# Patient Record
Sex: MALE | Race: WHITE | NOT HISPANIC OR LATINO | Employment: OTHER | URBAN - METROPOLITAN AREA
[De-identification: names, ages, dates, MRNs, and addresses within clinical notes are randomized per-mention and may not be internally consistent; named-entity substitution may affect disease eponyms.]

---

## 2024-04-19 ENCOUNTER — LAB (OUTPATIENT)
Dept: LAB | Facility: LAB | Age: 79
End: 2024-04-19
Payer: MEDICARE

## 2024-04-19 DIAGNOSIS — Z00.00 ENCOUNTER FOR GENERAL ADULT MEDICAL EXAMINATION WITHOUT ABNORMAL FINDINGS: Primary | ICD-10-CM

## 2024-04-19 DIAGNOSIS — E78.5 HYPERLIPIDEMIA, UNSPECIFIED: ICD-10-CM

## 2024-04-19 DIAGNOSIS — I10 ESSENTIAL (PRIMARY) HYPERTENSION: ICD-10-CM

## 2024-04-19 LAB
ALBUMIN SERPL BCP-MCNC: 4.4 G/DL (ref 3.4–5)
ALP SERPL-CCNC: 69 U/L (ref 33–136)
ALT SERPL W P-5'-P-CCNC: 13 U/L (ref 10–52)
ANION GAP SERPL CALC-SCNC: 14 MMOL/L (ref 10–20)
APPEARANCE UR: CLEAR
AST SERPL W P-5'-P-CCNC: 16 U/L (ref 9–39)
BILIRUB SERPL-MCNC: 0.6 MG/DL (ref 0–1.2)
BILIRUB UR STRIP.AUTO-MCNC: NEGATIVE MG/DL
BUN SERPL-MCNC: 15 MG/DL (ref 6–23)
CALCIUM SERPL-MCNC: 9.6 MG/DL (ref 8.6–10.3)
CHLORIDE SERPL-SCNC: 102 MMOL/L (ref 98–107)
CHOLEST SERPL-MCNC: 137 MG/DL (ref 0–199)
CHOLESTEROL/HDL RATIO: 3.2
CO2 SERPL-SCNC: 28 MMOL/L (ref 21–32)
COLOR UR: YELLOW
CREAT SERPL-MCNC: 1.24 MG/DL (ref 0.5–1.3)
EGFRCR SERPLBLD CKD-EPI 2021: 60 ML/MIN/1.73M*2
ERYTHROCYTE [DISTWIDTH] IN BLOOD BY AUTOMATED COUNT: 12.4 % (ref 11.5–14.5)
GLUCOSE SERPL-MCNC: 96 MG/DL (ref 74–99)
GLUCOSE UR STRIP.AUTO-MCNC: NEGATIVE MG/DL
HCT VFR BLD AUTO: 48.7 % (ref 41–52)
HDLC SERPL-MCNC: 42.7 MG/DL
HGB BLD-MCNC: 16.8 G/DL (ref 13.5–17.5)
KETONES UR STRIP.AUTO-MCNC: NEGATIVE MG/DL
LDLC SERPL CALC-MCNC: 64 MG/DL
LEUKOCYTE ESTERASE UR QL STRIP.AUTO: NEGATIVE
MCH RBC QN AUTO: 32.2 PG (ref 26–34)
MCHC RBC AUTO-ENTMCNC: 34.5 G/DL (ref 32–36)
MCV RBC AUTO: 93 FL (ref 80–100)
NITRITE UR QL STRIP.AUTO: NEGATIVE
NON HDL CHOLESTEROL: 94 MG/DL (ref 0–149)
NRBC BLD-RTO: 0 /100 WBCS (ref 0–0)
PH UR STRIP.AUTO: 6 [PH]
PLATELET # BLD AUTO: 196 X10*3/UL (ref 150–450)
POTASSIUM SERPL-SCNC: 5.3 MMOL/L (ref 3.5–5.3)
PROT SERPL-MCNC: 7 G/DL (ref 6.4–8.2)
PROT UR STRIP.AUTO-MCNC: NEGATIVE MG/DL
RBC # BLD AUTO: 5.22 X10*6/UL (ref 4.5–5.9)
RBC # UR STRIP.AUTO: NEGATIVE /UL
SODIUM SERPL-SCNC: 139 MMOL/L (ref 136–145)
SP GR UR STRIP.AUTO: 1.02
TRIGL SERPL-MCNC: 150 MG/DL (ref 0–149)
UROBILINOGEN UR STRIP.AUTO-MCNC: 2 MG/DL
VLDL: 30 MG/DL (ref 0–40)
WBC # BLD AUTO: 8.9 X10*3/UL (ref 4.4–11.3)

## 2024-04-19 PROCEDURE — 84153 ASSAY OF PSA TOTAL: CPT

## 2024-04-19 PROCEDURE — 80053 COMPREHEN METABOLIC PANEL: CPT

## 2024-04-19 PROCEDURE — 81003 URINALYSIS AUTO W/O SCOPE: CPT

## 2024-04-19 PROCEDURE — 83036 HEMOGLOBIN GLYCOSYLATED A1C: CPT

## 2024-04-19 PROCEDURE — 80061 LIPID PANEL: CPT

## 2024-04-19 PROCEDURE — 85027 COMPLETE CBC AUTOMATED: CPT

## 2024-04-19 PROCEDURE — 36415 COLL VENOUS BLD VENIPUNCTURE: CPT

## 2024-04-20 LAB — PSA SERPL-MCNC: 2.14 NG/ML

## 2024-04-21 LAB
EST. AVERAGE GLUCOSE BLD GHB EST-MCNC: 103 MG/DL
HBA1C MFR BLD: 5.2 %

## 2024-04-25 ENCOUNTER — APPOINTMENT (OUTPATIENT)
Dept: RADIOLOGY | Facility: HOSPITAL | Age: 79
End: 2024-04-25
Payer: MEDICARE

## 2024-04-25 ENCOUNTER — HOSPITAL ENCOUNTER (OUTPATIENT)
Facility: HOSPITAL | Age: 79
Setting detail: OBSERVATION
Discharge: HOME | End: 2024-04-26
Attending: STUDENT IN AN ORGANIZED HEALTH CARE EDUCATION/TRAINING PROGRAM | Admitting: INTERNAL MEDICINE
Payer: MEDICARE

## 2024-04-25 ENCOUNTER — APPOINTMENT (OUTPATIENT)
Dept: VASCULAR MEDICINE | Facility: HOSPITAL | Age: 79
End: 2024-04-25
Payer: MEDICARE
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
## 2024-04-25 DIAGNOSIS — R60.0 LOCALIZED EDEMA: ICD-10-CM

## 2024-04-25 DIAGNOSIS — R42 DIZZINESS: ICD-10-CM

## 2024-04-25 LAB
ABO GROUP (TYPE) IN BLOOD: NORMAL
ALBUMIN SERPL BCP-MCNC: 3.9 G/DL (ref 3.4–5)
ALP SERPL-CCNC: 58 U/L (ref 33–136)
ALT SERPL W P-5'-P-CCNC: 15 U/L (ref 10–52)
ANION GAP SERPL CALC-SCNC: 8 MMOL/L (ref 10–20)
ANTIBODY SCREEN: NORMAL
AST SERPL W P-5'-P-CCNC: 17 U/L (ref 9–39)
BASOPHILS # BLD AUTO: 0.06 X10*3/UL (ref 0–0.1)
BASOPHILS NFR BLD AUTO: 0.9 %
BILIRUB SERPL-MCNC: 0.5 MG/DL (ref 0–1.2)
BNP SERPL-MCNC: 101 PG/ML (ref 0–99)
BUN SERPL-MCNC: 14 MG/DL (ref 6–23)
CALCIUM SERPL-MCNC: 8.6 MG/DL (ref 8.6–10.3)
CARDIAC TROPONIN I PNL SERPL HS: 4 NG/L (ref 0–20)
CHLORIDE SERPL-SCNC: 106 MMOL/L (ref 98–107)
CO2 SERPL-SCNC: 29 MMOL/L (ref 21–32)
CREAT SERPL-MCNC: 1.21 MG/DL (ref 0.5–1.3)
D DIMER PPP FEU-MCNC: 1105 NG/ML FEU
EGFRCR SERPLBLD CKD-EPI 2021: 62 ML/MIN/1.73M*2
EOSINOPHIL # BLD AUTO: 0.15 X10*3/UL (ref 0–0.4)
EOSINOPHIL NFR BLD AUTO: 2.2 %
ERYTHROCYTE [DISTWIDTH] IN BLOOD BY AUTOMATED COUNT: 12.4 % (ref 11.5–14.5)
ETHANOL SERPL-MCNC: <10 MG/DL
GLUCOSE SERPL-MCNC: 135 MG/DL (ref 74–99)
HCT VFR BLD AUTO: 43.8 % (ref 41–52)
HGB BLD-MCNC: 14.8 G/DL (ref 13.5–17.5)
IMM GRANULOCYTES # BLD AUTO: 0.02 X10*3/UL (ref 0–0.5)
IMM GRANULOCYTES NFR BLD AUTO: 0.3 % (ref 0–0.9)
INR PPP: 1 (ref 0.9–1.1)
LACTATE SERPL-SCNC: 1.8 MMOL/L (ref 0.4–2)
LYMPHOCYTES # BLD AUTO: 1.68 X10*3/UL (ref 0.8–3)
LYMPHOCYTES NFR BLD AUTO: 24.3 %
MAGNESIUM SERPL-MCNC: 1.95 MG/DL (ref 1.6–2.4)
MCH RBC QN AUTO: 31.6 PG (ref 26–34)
MCHC RBC AUTO-ENTMCNC: 33.8 G/DL (ref 32–36)
MCV RBC AUTO: 93 FL (ref 80–100)
MONOCYTES # BLD AUTO: 0.4 X10*3/UL (ref 0.05–0.8)
MONOCYTES NFR BLD AUTO: 5.8 %
NEUTROPHILS # BLD AUTO: 4.59 X10*3/UL (ref 1.6–5.5)
NEUTROPHILS NFR BLD AUTO: 66.5 %
NRBC BLD-RTO: 0 /100 WBCS (ref 0–0)
PLATELET # BLD AUTO: 171 X10*3/UL (ref 150–450)
POTASSIUM SERPL-SCNC: 4.3 MMOL/L (ref 3.5–5.3)
PROT SERPL-MCNC: 6.4 G/DL (ref 6.4–8.2)
PROTHROMBIN TIME: 11.8 SECONDS (ref 9.8–12.8)
RBC # BLD AUTO: 4.69 X10*6/UL (ref 4.5–5.9)
RH FACTOR (ANTIGEN D): NORMAL
SODIUM SERPL-SCNC: 139 MMOL/L (ref 136–145)
WBC # BLD AUTO: 6.9 X10*3/UL (ref 4.4–11.3)

## 2024-04-25 PROCEDURE — 84484 ASSAY OF TROPONIN QUANT: CPT | Performed by: STUDENT IN AN ORGANIZED HEALTH CARE EDUCATION/TRAINING PROGRAM

## 2024-04-25 PROCEDURE — 70551 MRI BRAIN STEM W/O DYE: CPT | Performed by: RADIOLOGY

## 2024-04-25 PROCEDURE — 70547 MR ANGIOGRAPHY NECK W/O DYE: CPT

## 2024-04-25 PROCEDURE — 93971 EXTREMITY STUDY: CPT | Performed by: INTERNAL MEDICINE

## 2024-04-25 PROCEDURE — 83605 ASSAY OF LACTIC ACID: CPT | Performed by: STUDENT IN AN ORGANIZED HEALTH CARE EDUCATION/TRAINING PROGRAM

## 2024-04-25 PROCEDURE — 70551 MRI BRAIN STEM W/O DYE: CPT

## 2024-04-25 PROCEDURE — 2550000001 HC RX 255 CONTRASTS: Performed by: INTERNAL MEDICINE

## 2024-04-25 PROCEDURE — 2500000004 HC RX 250 GENERAL PHARMACY W/ HCPCS (ALT 636 FOR OP/ED)

## 2024-04-25 PROCEDURE — G0378 HOSPITAL OBSERVATION PER HR: HCPCS

## 2024-04-25 PROCEDURE — 70450 CT HEAD/BRAIN W/O DYE: CPT

## 2024-04-25 PROCEDURE — 70547 MR ANGIOGRAPHY NECK W/O DYE: CPT | Performed by: RADIOLOGY

## 2024-04-25 PROCEDURE — 71275 CT ANGIOGRAPHY CHEST: CPT

## 2024-04-25 PROCEDURE — 84075 ASSAY ALKALINE PHOSPHATASE: CPT | Performed by: STUDENT IN AN ORGANIZED HEALTH CARE EDUCATION/TRAINING PROGRAM

## 2024-04-25 PROCEDURE — 99285 EMERGENCY DEPT VISIT HI MDM: CPT | Mod: 25

## 2024-04-25 PROCEDURE — 96372 THER/PROPH/DIAG INJ SC/IM: CPT

## 2024-04-25 PROCEDURE — 83735 ASSAY OF MAGNESIUM: CPT | Performed by: STUDENT IN AN ORGANIZED HEALTH CARE EDUCATION/TRAINING PROGRAM

## 2024-04-25 PROCEDURE — 71275 CT ANGIOGRAPHY CHEST: CPT | Performed by: RADIOLOGY

## 2024-04-25 PROCEDURE — 83880 ASSAY OF NATRIURETIC PEPTIDE: CPT | Performed by: STUDENT IN AN ORGANIZED HEALTH CARE EDUCATION/TRAINING PROGRAM

## 2024-04-25 PROCEDURE — 99222 1ST HOSP IP/OBS MODERATE 55: CPT

## 2024-04-25 PROCEDURE — 70450 CT HEAD/BRAIN W/O DYE: CPT | Performed by: STUDENT IN AN ORGANIZED HEALTH CARE EDUCATION/TRAINING PROGRAM

## 2024-04-25 PROCEDURE — 85610 PROTHROMBIN TIME: CPT | Performed by: STUDENT IN AN ORGANIZED HEALTH CARE EDUCATION/TRAINING PROGRAM

## 2024-04-25 PROCEDURE — 70544 MR ANGIOGRAPHY HEAD W/O DYE: CPT | Mod: 59

## 2024-04-25 PROCEDURE — 2500000006 HC RX 250 W HCPCS SELF ADMINISTERED DRUGS (ALT 637 FOR ALL PAYERS): Mod: MUE

## 2024-04-25 PROCEDURE — 93971 EXTREMITY STUDY: CPT

## 2024-04-25 PROCEDURE — 72125 CT NECK SPINE W/O DYE: CPT | Performed by: STUDENT IN AN ORGANIZED HEALTH CARE EDUCATION/TRAINING PROGRAM

## 2024-04-25 PROCEDURE — 2500000006 HC RX 250 W HCPCS SELF ADMINISTERED DRUGS (ALT 637 FOR ALL PAYERS)

## 2024-04-25 PROCEDURE — 86901 BLOOD TYPING SEROLOGIC RH(D): CPT | Performed by: STUDENT IN AN ORGANIZED HEALTH CARE EDUCATION/TRAINING PROGRAM

## 2024-04-25 PROCEDURE — 82077 ASSAY SPEC XCP UR&BREATH IA: CPT | Performed by: STUDENT IN AN ORGANIZED HEALTH CARE EDUCATION/TRAINING PROGRAM

## 2024-04-25 PROCEDURE — 2500000001 HC RX 250 WO HCPCS SELF ADMINISTERED DRUGS (ALT 637 FOR MEDICARE OP)

## 2024-04-25 PROCEDURE — 36415 COLL VENOUS BLD VENIPUNCTURE: CPT | Performed by: STUDENT IN AN ORGANIZED HEALTH CARE EDUCATION/TRAINING PROGRAM

## 2024-04-25 PROCEDURE — 72125 CT NECK SPINE W/O DYE: CPT

## 2024-04-25 PROCEDURE — 85025 COMPLETE CBC W/AUTO DIFF WBC: CPT | Performed by: STUDENT IN AN ORGANIZED HEALTH CARE EDUCATION/TRAINING PROGRAM

## 2024-04-25 PROCEDURE — 85379 FIBRIN DEGRADATION QUANT: CPT | Performed by: STUDENT IN AN ORGANIZED HEALTH CARE EDUCATION/TRAINING PROGRAM

## 2024-04-25 PROCEDURE — 70544 MR ANGIOGRAPHY HEAD W/O DYE: CPT | Performed by: RADIOLOGY

## 2024-04-25 RX ORDER — SIMVASTATIN 20 MG/1
40 TABLET, FILM COATED ORAL NIGHTLY
Status: DISCONTINUED | OUTPATIENT
Start: 2024-04-25 | End: 2024-04-26 | Stop reason: HOSPADM

## 2024-04-25 RX ORDER — ENOXAPARIN SODIUM 100 MG/ML
40 INJECTION SUBCUTANEOUS EVERY 24 HOURS
Status: DISCONTINUED | OUTPATIENT
Start: 2024-04-25 | End: 2024-04-26 | Stop reason: HOSPADM

## 2024-04-25 RX ORDER — METOPROLOL TARTRATE 25 MG/1
25 TABLET, FILM COATED ORAL 2 TIMES DAILY
Status: DISCONTINUED | OUTPATIENT
Start: 2024-04-25 | End: 2024-04-26 | Stop reason: HOSPADM

## 2024-04-25 RX ORDER — METOPROLOL TARTRATE 25 MG/1
1 TABLET, FILM COATED ORAL 2 TIMES DAILY
COMMUNITY

## 2024-04-25 RX ORDER — CLOPIDOGREL BISULFATE 75 MG/1
1 TABLET ORAL DAILY
COMMUNITY

## 2024-04-25 RX ORDER — ASPIRIN 81 MG/1
81 TABLET ORAL NIGHTLY
COMMUNITY

## 2024-04-25 RX ORDER — ONDANSETRON HYDROCHLORIDE 2 MG/ML
4 INJECTION, SOLUTION INTRAVENOUS EVERY 6 HOURS PRN
Status: DISCONTINUED | OUTPATIENT
Start: 2024-04-25 | End: 2024-04-26 | Stop reason: HOSPADM

## 2024-04-25 RX ORDER — ASPIRIN 81 MG/1
81 TABLET ORAL NIGHTLY
Status: DISCONTINUED | OUTPATIENT
Start: 2024-04-25 | End: 2024-04-26 | Stop reason: HOSPADM

## 2024-04-25 RX ORDER — MULTIVIT-MIN/IRON FUM/FOLIC AC 7.5 MG-4
1 TABLET ORAL NIGHTLY
Status: DISCONTINUED | OUTPATIENT
Start: 2024-04-25 | End: 2024-04-26 | Stop reason: HOSPADM

## 2024-04-25 RX ORDER — ACETAMINOPHEN 325 MG/1
650 TABLET ORAL EVERY 4 HOURS PRN
Status: DISCONTINUED | OUTPATIENT
Start: 2024-04-25 | End: 2024-04-26 | Stop reason: HOSPADM

## 2024-04-25 RX ORDER — SIMVASTATIN 40 MG/1
1 TABLET, FILM COATED ORAL NIGHTLY
COMMUNITY

## 2024-04-25 RX ORDER — CLOPIDOGREL BISULFATE 75 MG/1
75 TABLET ORAL DAILY
Status: DISCONTINUED | OUTPATIENT
Start: 2024-04-26 | End: 2024-04-26 | Stop reason: HOSPADM

## 2024-04-25 RX ORDER — MULTIVIT-MIN/IRON FUM/FOLIC AC 7.5 MG-4
1 TABLET ORAL NIGHTLY
COMMUNITY

## 2024-04-25 RX ADMIN — ASPIRIN 81 MG: 81 TABLET, COATED ORAL at 20:35

## 2024-04-25 RX ADMIN — IOHEXOL 75 ML: 350 INJECTION, SOLUTION INTRAVENOUS at 20:10

## 2024-04-25 RX ADMIN — SIMVASTATIN 40 MG: 20 TABLET, FILM COATED ORAL at 20:35

## 2024-04-25 RX ADMIN — METOPROLOL TARTRATE 25 MG: 25 TABLET, FILM COATED ORAL at 20:35

## 2024-04-25 RX ADMIN — Medication 1 TABLET: at 20:35

## 2024-04-25 RX ADMIN — ENOXAPARIN SODIUM 40 MG: 40 INJECTION SUBCUTANEOUS at 20:34

## 2024-04-25 SDOH — SOCIAL STABILITY: SOCIAL INSECURITY: HAS ANYONE EVER THREATENED TO HURT YOUR FAMILY OR YOUR PETS?: NO

## 2024-04-25 SDOH — SOCIAL STABILITY: SOCIAL INSECURITY: ARE THERE ANY APPARENT SIGNS OF INJURIES/BEHAVIORS THAT COULD BE RELATED TO ABUSE/NEGLECT?: NO

## 2024-04-25 SDOH — SOCIAL STABILITY: SOCIAL INSECURITY: WERE YOU ABLE TO COMPLETE ALL THE BEHAVIORAL HEALTH SCREENINGS?: YES

## 2024-04-25 SDOH — SOCIAL STABILITY: SOCIAL INSECURITY: HAVE YOU HAD THOUGHTS OF HARMING ANYONE ELSE?: NO

## 2024-04-25 SDOH — SOCIAL STABILITY: SOCIAL INSECURITY: HAVE YOU HAD ANY THOUGHTS OF HARMING ANYONE ELSE?: NO

## 2024-04-25 SDOH — SOCIAL STABILITY: SOCIAL INSECURITY: DO YOU FEEL ANYONE HAS EXPLOITED OR TAKEN ADVANTAGE OF YOU FINANCIALLY OR OF YOUR PERSONAL PROPERTY?: NO

## 2024-04-25 SDOH — SOCIAL STABILITY: SOCIAL INSECURITY: ARE YOU OR HAVE YOU BEEN THREATENED OR ABUSED PHYSICALLY, EMOTIONALLY, OR SEXUALLY BY ANYONE?: NO

## 2024-04-25 SDOH — SOCIAL STABILITY: SOCIAL INSECURITY: ABUSE: ADULT

## 2024-04-25 SDOH — SOCIAL STABILITY: SOCIAL INSECURITY: DOES ANYONE TRY TO KEEP YOU FROM HAVING/CONTACTING OTHER FRIENDS OR DOING THINGS OUTSIDE YOUR HOME?: NO

## 2024-04-25 SDOH — SOCIAL STABILITY: SOCIAL INSECURITY: DO YOU FEEL UNSAFE GOING BACK TO THE PLACE WHERE YOU ARE LIVING?: NO

## 2024-04-25 ASSESSMENT — PAIN - FUNCTIONAL ASSESSMENT
PAIN_FUNCTIONAL_ASSESSMENT: 0-10
PAIN_FUNCTIONAL_ASSESSMENT: 0-10

## 2024-04-25 ASSESSMENT — LIFESTYLE VARIABLES
EVER HAD A DRINK FIRST THING IN THE MORNING TO STEADY YOUR NERVES TO GET RID OF A HANGOVER: NO
EVER FELT BAD OR GUILTY ABOUT YOUR DRINKING: NO
HAVE YOU EVER FELT YOU SHOULD CUT DOWN ON YOUR DRINKING: NO
AUDIT-C TOTAL SCORE: 1
SKIP TO QUESTIONS 9-10: 1
TOTAL SCORE: 0
HAVE PEOPLE ANNOYED YOU BY CRITICIZING YOUR DRINKING: NO
HOW MANY STANDARD DRINKS CONTAINING ALCOHOL DO YOU HAVE ON A TYPICAL DAY: 1 OR 2
HOW OFTEN DO YOU HAVE A DRINK CONTAINING ALCOHOL: MONTHLY OR LESS
AUDIT-C TOTAL SCORE: 1
PRESCIPTION_ABUSE_PAST_12_MONTHS: NO
SUBSTANCE_ABUSE_PAST_12_MONTHS: NO
HOW OFTEN DO YOU HAVE 6 OR MORE DRINKS ON ONE OCCASION: NEVER

## 2024-04-25 ASSESSMENT — ACTIVITIES OF DAILY LIVING (ADL)
FEEDING YOURSELF: INDEPENDENT
GROOMING: INDEPENDENT
BATHING: INDEPENDENT
DRESSING YOURSELF: INDEPENDENT
WALKS IN HOME: INDEPENDENT
HEARING - LEFT EAR: FUNCTIONAL
PATIENT'S MEMORY ADEQUATE TO SAFELY COMPLETE DAILY ACTIVITIES?: YES
JUDGMENT_ADEQUATE_SAFELY_COMPLETE_DAILY_ACTIVITIES: YES
LACK_OF_TRANSPORTATION: NO
ADEQUATE_TO_COMPLETE_ADL: YES
HEARING - RIGHT EAR: FUNCTIONAL
TOILETING: INDEPENDENT

## 2024-04-25 ASSESSMENT — COLUMBIA-SUICIDE SEVERITY RATING SCALE - C-SSRS
1. IN THE PAST MONTH, HAVE YOU WISHED YOU WERE DEAD OR WISHED YOU COULD GO TO SLEEP AND NOT WAKE UP?: NO
6. HAVE YOU EVER DONE ANYTHING, STARTED TO DO ANYTHING, OR PREPARED TO DO ANYTHING TO END YOUR LIFE?: NO
2. HAVE YOU ACTUALLY HAD ANY THOUGHTS OF KILLING YOURSELF?: NO

## 2024-04-25 ASSESSMENT — COGNITIVE AND FUNCTIONAL STATUS - GENERAL
MOBILITY SCORE: 24
DAILY ACTIVITIY SCORE: 24
PATIENT BASELINE BEDBOUND: NO
DAILY ACTIVITIY SCORE: 24
MOBILITY SCORE: 24

## 2024-04-25 ASSESSMENT — PAIN SCALES - GENERAL
PAINLEVEL_OUTOF10: 0 - NO PAIN

## 2024-04-25 ASSESSMENT — PATIENT HEALTH QUESTIONNAIRE - PHQ9
SUM OF ALL RESPONSES TO PHQ9 QUESTIONS 1 & 2: 0
1. LITTLE INTEREST OR PLEASURE IN DOING THINGS: NOT AT ALL
2. FEELING DOWN, DEPRESSED OR HOPELESS: NOT AT ALL

## 2024-04-25 NOTE — CARE PLAN
The patient's goals for the shift include  remain free from injury.    The clinical goals for the shift include use call light appropriately when getting up.    Over the shift, the patient did not make progress toward the following goals. Barriers to progression include acute illness. Recommendations to address these barriers include communication.

## 2024-04-25 NOTE — H&P
History Of Present Illness  Aleaxnder Rose is a 79-year-old male who came to the ED today after going to his primary care doctor appointment. Patient has a history of CAD with stent placement, obesity, venous stasis, and mild to moderate sensorineural hearing loss. Patient reports that he fell almost two weeks ago and saw his primary care doctor who wanted him to follow up with an MRI/have labs drawn and then come back for another appointment. Since the time from the first fall patient has been having episodes where he feels out of balance. Patient denies dizziness, headaches, changes in vision, or where he feels like the room is spinning. Yesterday patient fell again, he said he has been feeling like he is going to fall forward and then loses his balance. Denies any N/V/D with these episodes. Patient today went to his primary care appointment today and reported he was unable to get the scans because they aren't scheduled for another few weeks so his primary care doctor sent him to the ED due to the increased falling. Patient states since the first fall his right leg has been swelling but has improved. Denies any visual cuts or change in strength. Patient also reports having urinary frequency and urgency throughout the day. Denies burning or pain while urinating. Patient denies any SOB, palpitations, chest pain, skin or sleep changes.   ED Course  Hemodynamically Stable.  Vitals: Temp.36.6 , HR 64 , Resp.16 , /69, Pulse ox 97 room air  Labs: Glucose 135, K+ 4.3,Na+ 139, Bun 14, Creat.  1.21, GFR 62, Ca 8.6, Alk Phos. 58, Albumin 3.9, ALT 15, AST 17, , Mg+ 1.95, Lact. 1.8, Hgb. 14.8, Hct. 43.8, Trop. 4, D-dimer 1,105  Medications: ASA, Plavix, Simvastatin  Imaging: CT cervical spine: No acute fracture or traumatic malalignment.  CT head: No acute intracranial abnormality or calvarial fracture.  Vascular ultrasound lower extremities: No evidence of acute deep vein thrombosis visualized, exam limited due  to habitus  EKG: Not available for my review     Past Medical History  Past Medical History:   Diagnosis Date    Chronic rhinitis     Rhinitis    Counseling, unspecified 09/16/2017    Health counseling    Other specified postprocedural states     H/O cardiac catheterization    Personal history of other diseases of the circulatory system     History of coronary atherosclerosis    Personal history of other diseases of the circulatory system     History of rheumatic fever    Personal history of other infectious and parasitic diseases     History of measles    Personal history of other infectious and parasitic diseases     History of varicella    Personal history of other infectious and parasitic diseases     History of mumps    Presence of coronary angioplasty implant and graft 09/15/2017    History of heart artery stent    Tobacco abuse counseling 09/16/2017    Tobacco abuse counseling   Kidney stones    Surgical History  Past Surgical History:   Procedure Laterality Date    EYE SURGERY  09/15/2017    Eye Surgery   Cardiac cathetrization 2012 with stent placement     Social History  Smokes tobacco products for 55 years. Current everyday smoker.   Live at home with son.     Family History   Mom: colon cancer, breast cancer, Father: CVA, cardiac issues  Allergies  Patient has no known allergies.    Review of Systems    10 point ROS systems completed and is negative except for what is stated in HPI.    Physical Exam  Constitutional:       General: He is awake. He is not in acute distress.     Appearance: Normal appearance. He is well-developed. He is not toxic-appearing.   HENT:      Head: Normocephalic and atraumatic.      Comments: Right eyelid droops. (Patient reports that is normal had surgery when 7)     Nose: Nose normal. No rhinorrhea.      Mouth/Throat:      Mouth: Mucous membranes are moist.      Tongue: Tongue deviates from midline.      Comments: Difficulty moving tongue back to the right, deviates to the  "left.   Eyes:      General:         Right eye: No discharge.         Left eye: No discharge.      Extraocular Movements: Extraocular movements intact.      Conjunctiva/sclera: Conjunctivae normal.      Pupils: Pupils are equal, round, and reactive to light.   Cardiovascular:      Rate and Rhythm: Normal rate and regular rhythm.      Pulses:           Radial pulses are 2+ on the right side and 2+ on the left side.        Dorsalis pedis pulses are 1+ on the right side and 2+ on the left side.      Heart sounds: Normal heart sounds.   Pulmonary:      Effort: Pulmonary effort is normal. No respiratory distress.      Breath sounds: Normal breath sounds. No wheezing.   Abdominal:      General: Bowel sounds are normal. There is no distension.      Palpations: Abdomen is soft.      Tenderness: There is no abdominal tenderness. There is no guarding.   Musculoskeletal:         General: Normal range of motion.      Cervical back: Normal range of motion and neck supple.      Right lower le+ Edema present.      Left lower leg: No edema.   Skin:     General: Skin is warm and dry.      Comments: Scab right anterior shin    Neurological:      General: No focal deficit present.      Mental Status: He is alert and oriented to person, place, and time. Mental status is at baseline.      GCS: GCS eye subscore is 4. GCS verbal subscore is 5. GCS motor subscore is 6.      Sensory: Sensation is intact.      Motor: Motor function is intact. No weakness.   Psychiatric:         Attention and Perception: Attention normal.         Mood and Affect: Mood normal.         Behavior: Behavior normal.          Last Recorded Vitals  Blood pressure 144/69, pulse 64, temperature 36.6 °C (97.9 °F), resp. rate 16, height 1.727 m (5' 8\"), weight 89.8 kg (197 lb 15.6 oz), SpO2 98%.    Relevant Results  Vascular US Lower Extremity Venous Duplex Right    Result Date: 2024           [address], [place], [place] [address] Tel " [phone] and Fax [phone]  Vascular Lab Report VASC US LOWER EXTREMITY VENOUS DUPLEX RIGHT  Patient Name:      JUANA MCKEON JASONARI   Reading Physician:  [id number] Lundberg MD Study Date:        4/25/2024            Ordering Physician: [id number] HONG HOYT MRN/PID:           [id number]             Technologist:       Kelsea Dinh RVT Accession#:        [id number]         Technologist 2: Date of Birth/Age: 1945 / 79 years Encounter#:         [id number] Gender:            M Admission Status:  Emergency            Location Performed: [facility]  Diagnosis/ICD: Localized (leg) edema-R60.0 Indication:    Limb edema CPT Codes:     39965 Peripheral venous duplex scan for DVT Limited  CONCLUSIONS: Right Lower Venous: No evidence of acute deep vein thrombus visualized in the right lower extremity. Cannot rule out thrombus in non-visualized posterior tibial and Peroneal veins due to body habitus and edema. Left Lower Venous: Left common femoral vein is negative for deep vein thrombus.  Imaging & Doppler Findings:  Right                 Compressible Thrombus        Flow Distal External Iliac     Yes        None   Spontaneous/Phasic CFV                       Yes        None   Spontaneous/Phasic PFV                       Yes        None FV Proximal               Yes        None   Spontaneous/Phasic FV Mid                    Yes        None FV Distal                 Yes        None Popliteal                 Yes        None   Spontaneous/Phasic  Left Compress Thrombus        Flow CFV    Yes      None   Spontaneous/Phasic  [id number] Fabiano Zuniga MD, DENNY Electronically signed by [id number] Lundberg MD on 4/25/2024 at 4:36:33 PM  ** Final **     CT cervical spine wo IV contrast    Result Date: 4/25/2024  Interpreted By:  Lakshmi Higgins, STUDY: CT CERVICAL SPINE WO IV CONTRAST   4/25/2024 2:38 pm   INDICATION: Signs/Symptoms:Fall, head trauma   COMPARISON: None.   ACCESSION NUMBER(S): [id number]   ORDERING CLINICIAN: HONG HOYT   TECHNIQUE: Axial CT images of the cervical spine are obtained. Coronal and sagittal reconstructions are provided for review.   FINDINGS: Prevertebral/Paraspinal Soft Tissues/Lung Apices: No acute abnormalities.   Other: Partial opacification right mastoid air cells. Small right sphenoid mucocele.   CERVICAL SPINE: Hardware: None. Fracture: None. Vertebral Body Heights: Normal. Alignment: No traumatic listhesis. Spinal canal and neural foramina: Multilevel spondylosis, most pronounced with severe neural foraminal narrowing at C3-C4 on the left, C4-C5 on the left, C5-C6 bilaterally. No high-grade canal stenosis.       No acute fracture or traumatic malalignment.   Signed by: Lakshmi Higgins 4/25/2024 2:57 PM Dictation workstation:   [id number]    CT head W O contrast trauma protocol    Result Date: 4/25/2024  Interpreted By:  Lakshmi Higgins, STUDY: CT HEAD W/O CONTRAST TRAUMA PROTOCOL;  4/25/2024 2:38 pm   INDICATION: Signs/Symptoms:Fall, head trauma.   COMPARISON: None.   ACCESSION NUMBER(S): [id number]   ORDERING CLINICIAN: HONG HOYT   TECHNIQUE: Noncontrast axial CT scan of head was performed.   FINDINGS: Parenchyma: There is no intracranial hemorrhage. The grey-white differentiation is intact. There is no mass effect or midline shift.   CSF Spaces: The ventricles, sulci and basal cisterns are within normal limits for age.   Extra-Axial Fluid: No extraaxial fluid collection.   Calvarium: No acute fracture.   Paranasal sinuses: Visualized paranasal sinuses are clear.   Mastoids: Clear.   Orbits: Normal.   Soft tissues: Unremarkable.       No acute intracranial abnormality or calvarial fracture.   MACRO: None   Signed by: Lakshmi Higgins 4/25/2024 2:49 PM Dictation workstation:   [id number]   Results for orders placed or performed during the  hospital encounter of 04/25/24 (from the past 24 hour(s))   CBC and Auto Differential   Result Value Ref Range    WBC 6.9 4.4 - 11.3 x10*3/uL    nRBC 0.0 0.0 - 0.0 /100 WBCs    RBC 4.69 4.50 - 5.90 x10*6/uL    Hemoglobin 14.8 13.5 - 17.5 g/dL    Hematocrit 43.8 41.0 - 52.0 %    MCV 93 80 - 100 fL    MCH 31.6 26.0 - 34.0 pg    MCHC 33.8 32.0 - 36.0 g/dL    RDW 12.4 11.5 - 14.5 %    Platelets 171 150 - 450 x10*3/uL    Neutrophils % 66.5 40.0 - 80.0 %    Immature Granulocytes %, Automated 0.3 0.0 - 0.9 %    Lymphocytes % 24.3 13.0 - 44.0 %    Monocytes % 5.8 2.0 - 10.0 %    Eosinophils % 2.2 0.0 - 6.0 %    Basophils % 0.9 0.0 - 2.0 %    Neutrophils Absolute 4.59 1.60 - 5.50 x10*3/uL    Immature Granulocytes Absolute, Automated 0.02 0.00 - 0.50 x10*3/uL    Lymphocytes Absolute 1.68 0.80 - 3.00 x10*3/uL    Monocytes Absolute 0.40 0.05 - 0.80 x10*3/uL    Eosinophils Absolute 0.15 0.00 - 0.40 x10*3/uL    Basophils Absolute 0.06 0.00 - 0.10 x10*3/uL   Comprehensive Metabolic Panel   Result Value Ref Range    Glucose 135 (H) 74 - 99 mg/dL    Sodium 139 136 - 145 mmol/L    Potassium 4.3 3.5 - 5.3 mmol/L    Chloride 106 98 - 107 mmol/L    Bicarbonate 29 21 - 32 mmol/L    Anion Gap 8 (L) 10 - 20 mmol/L    Urea Nitrogen 14 6 - 23 mg/dL    Creatinine 1.21 0.50 - 1.30 mg/dL    eGFR 62 >60 mL/min/1.73m*2    Calcium 8.6 8.6 - 10.3 mg/dL    Albumin 3.9 3.4 - 5.0 g/dL    Alkaline Phosphatase 58 33 - 136 U/L    Total Protein 6.4 6.4 - 8.2 g/dL    AST 17 9 - 39 U/L    Bilirubin, Total 0.5 0.0 - 1.2 mg/dL    ALT 15 10 - 52 U/L   Alcohol   Result Value Ref Range    Alcohol <10 <=10 mg/dL   Lactate   Result Value Ref Range    Lactate 1.8 0.4 - 2.0 mmol/L   Type And Screen   Result Value Ref Range    ABO TYPE B     Rh TYPE NEG     ANTIBODY SCREEN NEG    Troponin I, High Sensitivity   Result Value Ref Range    Troponin I, High Sensitivity 4 0 - 20 ng/L   B-Type Natriuretic Peptide   Result Value Ref Range     (H) 0 - 99 pg/mL    Magnesium   Result Value Ref Range    Magnesium 1.95 1.60 - 2.40 mg/dL   Protime-INR   Result Value Ref Range    Protime 11.8 9.8 - 12.8 seconds    INR 1.0 0.9 - 1.1   D-Dimer, VTE Exclusion   Result Value Ref Range    D-Dimer, Quantitative VTE Exclusion 1,105 (H) <=500 ng/mL FEU          Assessment/Plan   Alexander Rose is a 79-year-old male who came to the ED today after going to his primary care doctor appointment. Patient reports that he fell almost two weeks ago and saw his primary care doctor who wanted him to follow up with an MRI/have labs drawn. . Since the time from the first fall patient has been having episodes where he feels out of balance. Patient denies dizziness, headaches, changes in vision, or where he feels like the room is spinning. Yesterday patient fell again, he said he has been feeling like he is going to fall forward and then loses his balance. Neurology consult ordered for further evaluation. MRA of head and neck ordered.     Patient to follow with Dr. Rinku Almanza for further medical management.    #suspect CVA vs TIA  #Elevated D-Dimer  #imbalance  #Falls  -MRA head/neck/Brain ordered  -Carotid U/S <50% stenosis bilaterally 06/15/2022  -CoAgs done d-dimer elevated, CT PE ordered   -Vascular u/s complete bilateral LE: negative for DVT 4/25/24  -Lipid panel, HA1C, 4/19/2024  -UA: negative 4/19/2024  -TSH ordered  -Neurology c/s  -Patient admitted to observation with telemetry  -NPO diet until passes nurse swallow  -[id number]  -morning labs  -PT/OT/Speech  -S/W for discharge planning    Chronic Conditions  #Venous stasis  #Obesity  #CAD  #Hearing loss  Continue home medications as ordered when nurse swallow is complete and passed.  Full code     #DVT Prophylaxis  Scd's as tolerated  Subcutaneous Lovenox  On Plavix    I spent 45 minutes in the professional and overall care of this patient.      CHRISTY Gurrola-CNP

## 2024-04-25 NOTE — PROGRESS NOTES
Pharmacy Medication History Review    Alexander Rose is a 79 y.o. male admitted for No Principal Problem: There is no principal problem currently on the Problem List. Please update the Problem List and refresh.. Pharmacy reviewed the patient's [id number] medications and allergies for accuracy.    The list below reflectives the updated PTA list. Please review each medication in order reconciliation for additional clarification and justification.  Prior to Admission medications    Medication Sig Start Date End Date Taking? Authorizing Provider   aspirin 81 mg EC tablet Take 1 tablet (81 mg) by mouth once daily at bedtime.   Yes Historical Provider, MD   clopidogrel (Plavix) 75 mg tablet Take 1 tablet (75 mg) by mouth once daily.   Yes Historical Provider, MD   metoprolol tartrate (Lopressor) 25 mg tablet Take 1 tablet (25 mg) by mouth 2 times a day.   Yes Historical Provider, MD   multivitamin with minerals tablet Take 1 tablet by mouth once daily at bedtime.   Yes Historical Provider, MD   simvastatin (Zocor) 40 mg tablet Take 1 tablet (40 mg) by mouth once daily at bedtime.   Yes Historical Provider, MD        The list below reflectives the updated allergy list. Please review each documented allergy for additional clarification and justification.  Allergies  Reviewed by Albert Noyola LPN on 4/25/2024   No Known Allergies         Below are additional concerns with the patient's PTA list.      Keeley Lucas

## 2024-04-26 VITALS
BODY MASS INDEX: 30 KG/M2 | SYSTOLIC BLOOD PRESSURE: 139 MMHG | RESPIRATION RATE: 18 BRPM | HEIGHT: 68 IN | OXYGEN SATURATION: 97 % | HEART RATE: 61 BPM | WEIGHT: 197.97 LBS | DIASTOLIC BLOOD PRESSURE: 69 MMHG | TEMPERATURE: 98.6 F

## 2024-04-26 LAB
ANION GAP SERPL CALC-SCNC: 10 MMOL/L (ref 10–20)
BUN SERPL-MCNC: 16 MG/DL (ref 6–23)
CALCIUM SERPL-MCNC: 8.4 MG/DL (ref 8.6–10.3)
CHLORIDE SERPL-SCNC: 109 MMOL/L (ref 98–107)
CO2 SERPL-SCNC: 26 MMOL/L (ref 21–32)
CREAT SERPL-MCNC: 1.14 MG/DL (ref 0.5–1.3)
EGFRCR SERPLBLD CKD-EPI 2021: 66 ML/MIN/1.73M*2
ERYTHROCYTE [DISTWIDTH] IN BLOOD BY AUTOMATED COUNT: 12.2 % (ref 11.5–14.5)
GLUCOSE SERPL-MCNC: 94 MG/DL (ref 74–99)
HCT VFR BLD AUTO: 44 % (ref 41–52)
HGB BLD-MCNC: 14.9 G/DL (ref 13.5–17.5)
MCH RBC QN AUTO: 31.4 PG (ref 26–34)
MCHC RBC AUTO-ENTMCNC: 33.9 G/DL (ref 32–36)
MCV RBC AUTO: 93 FL (ref 80–100)
NRBC BLD-RTO: 0 /100 WBCS (ref 0–0)
PLATELET # BLD AUTO: 150 X10*3/UL (ref 150–450)
POTASSIUM SERPL-SCNC: 4.6 MMOL/L (ref 3.5–5.3)
RBC # BLD AUTO: 4.75 X10*6/UL (ref 4.5–5.9)
SODIUM SERPL-SCNC: 140 MMOL/L (ref 136–145)
TSH SERPL-ACNC: 2.96 MIU/L (ref 0.44–3.98)
WBC # BLD AUTO: 6.5 X10*3/UL (ref 4.4–11.3)

## 2024-04-26 PROCEDURE — 97165 OT EVAL LOW COMPLEX 30 MIN: CPT | Mod: GO

## 2024-04-26 PROCEDURE — 2500000001 HC RX 250 WO HCPCS SELF ADMINISTERED DRUGS (ALT 637 FOR MEDICARE OP)

## 2024-04-26 PROCEDURE — 85027 COMPLETE CBC AUTOMATED: CPT

## 2024-04-26 PROCEDURE — 92610 EVALUATE SWALLOWING FUNCTION: CPT | Mod: GN | Performed by: SPEECH-LANGUAGE PATHOLOGIST

## 2024-04-26 PROCEDURE — 84443 ASSAY THYROID STIM HORMONE: CPT

## 2024-04-26 PROCEDURE — 36415 COLL VENOUS BLD VENIPUNCTURE: CPT

## 2024-04-26 PROCEDURE — 97161 PT EVAL LOW COMPLEX 20 MIN: CPT | Mod: GP

## 2024-04-26 PROCEDURE — G0378 HOSPITAL OBSERVATION PER HR: HCPCS

## 2024-04-26 PROCEDURE — 80048 BASIC METABOLIC PNL TOTAL CA: CPT

## 2024-04-26 PROCEDURE — 99232 SBSQ HOSP IP/OBS MODERATE 35: CPT | Performed by: PSYCHIATRY & NEUROLOGY

## 2024-04-26 RX ADMIN — CLOPIDOGREL BISULFATE 75 MG: 75 TABLET ORAL at 09:09

## 2024-04-26 RX ADMIN — METOPROLOL TARTRATE 25 MG: 25 TABLET, FILM COATED ORAL at 09:09

## 2024-04-26 ASSESSMENT — COGNITIVE AND FUNCTIONAL STATUS - GENERAL
HELP NEEDED FOR BATHING: A LITTLE
DAILY ACTIVITIY SCORE: 20
MOBILITY SCORE: 24
PERSONAL GROOMING: A LITTLE
DRESSING REGULAR LOWER BODY CLOTHING: A LITTLE
DAILY ACTIVITIY SCORE: 24
MOBILITY SCORE: 24
TOILETING: A LITTLE

## 2024-04-26 ASSESSMENT — PAIN - FUNCTIONAL ASSESSMENT
PAIN_FUNCTIONAL_ASSESSMENT: 0-10

## 2024-04-26 ASSESSMENT — PAIN SCALES - GENERAL
PAINLEVEL_OUTOF10: 0 - NO PAIN

## 2024-04-26 NOTE — CARE PLAN
The patient's goals for the shift include      The clinical goals for the shift include use call light apperoperitly    Over the shift, the patient did not make progress toward the following goals. Barriers to progression include. Ongoing Recommendations to address these barriers include. Ongoing

## 2024-04-26 NOTE — PROGRESS NOTES
Occupational Therapy    Evaluation    Patient Name: Alexander Rose  MRN: [id number]  Today's Date: 4/26/2024  Time Calculation  Start Time: 1208  Stop Time: 1228  Time Calculation (min): 20 min    Assessment  IP OT Assessment  OT Assessment: Pt demonstrates a decline in adl/fucntional mob status since fall. Recommend continued OT tx intervention  Plan:  Treatment Interventions: ADL retraining, Functional transfer training  OT Frequency: 2 times per week  OT Discharge Recommendations: Low intensity level of continued care  OT - OK to Discharge: Yes (okay to d/c pending medical team approval)    Subjective   Current Problem:  1. Fall, initial encounter        2. Localized edema  Vascular US Lower Extremity Venous Duplex Right      3. Dizziness          General:  General  Reason for Referral: OT  EVAL/TX/IMPAIRED FUNCITONAL LIVNG SKILLS  Referred By: Rinku Almanza  Past Medical History Relevant to Rehab: PT fell yesterday hitting back of head (Past med history of CAD with stent placement, obesity, venous stasis, and mild to moderate sensorineural hearing loss. Patient reports that he fell almost two weeks ago)  Co-Treatment: PT  Co-Treatment Reason: increase pt safety  Prior to Session Communication: Bedside nurse  Precautions:   fall       Pain:0/10       Objective   Cognition:  Overall Cognitive Status: Within Functional Limits           Home Living:  Type of Home:  (5 entry steps with rail into home, bed/bath 2nd level, no 1/2 bath 1st floor. Tub/shower combo 2nd floor without DME. Toliet - no dme. Pt  indep with driving/ adl/cooking PTA. No ADL equip. Pt owns a standard walker and straight cane  but does not use.)  Lives With: Alone   Prior Function:Indep with driving, adl, home mgnt       Bed Mobility/Transfers: Bed Mobility  Bed Mobility:  (indep)    Transfers  Transfer:  (sit to stand - indep)      Functional Mobility:  Functional Mobility  Functional Mobility Performed:  (Supervision with turns- uses wall for  support, Supervison with straight cane use)            Vision:  wfl  Sensation:     Strength:strength below elbows wfl     Perception: wfl, no visual field cuts or dizziness with finger tracking     Coordination:    wfl  Hand Function:  Hand Function  Gross Grasp: Functional  Coordination: Functional  Extremities: RUE   RUE : Within Functional Limits and LUE   LUE: Within Functional Limits    Outcome Measures: [facility] Daily Activity  Putting on and taking off regular lower body clothing: A little  Bathing (including washing, rinsing, drying): A little  Putting on and taking off regular upper body clothing: None  Toileting, which includes using toilet, bedpan or urinal: A little  Taking care of personal grooming such as brushing teeth: A little (sink level)  Eating Meals: None  Daily Activity - Total Score: 20      Education Documentation  Body Mechanics, taught by Anne Deluca OT at 4/26/2024  2:02 PM.  Learner: Patient  Readiness: Acceptance  Method: Demonstration  Response: Demonstrated Understanding, Needs Reinforcement    Precautions, taught by Anne Deluca OT at 4/26/2024  2:02 PM.  Learner: Patient  Readiness: Acceptance  Method: Demonstration  Response: Demonstrated Understanding, Needs Reinforcement    ADL Training, taught by Anne Deluca OT at 4/26/2024  2:02 PM.  Learner: Patient  Readiness: Acceptance  Method: Demonstration  Response: Demonstrated Understanding, Needs Reinforcement    Education Comments  No comments found.      Goals:   Encounter Problems       Encounter Problems (Active)       impaired functional daily living skills       Pt will increase Grooming to s/mod indep (sink level)   Upper Body Bathing to s/mod indep    Lower Body Bathing  to s/mod indep    Increase  indep  and LE Dressing to s/mod indep with/ without adaptive equipment as needed       Start:  04/26/24    Expected End:  05/10/24            Pt will increase  Functional Mobility  with /without a device to s/mod  indep  chair/toliet/room to increase indep/safety in patients discharge environment       Start:  04/26/24    Expected End:  05/10/24

## 2024-04-26 NOTE — PROGRESS NOTES
Speech-Language Pathology    SLP Adult Inpatient Speech-Language Pathology Clinical Swallow Evaluation    Patient Name: Alexander Rose  MRN: [id number]  Today's Date: 4/26/2024   Time Calculation  Start Time: 0840  Stop Time: 0900  Time Calculation (min): 20 min     Current Problem:   1. Fall, initial encounter        2. Localized edema  Vascular US Lower Extremity Venous Duplex Right      3. Dizziness          Recommendations:  Ok to continue REGULAR TEXTURES, THIN LIQUIDS, no texture limitations.  General aspiration precautions.   Upright for meals/meds     Assessment:  Pt presents with intact oral phase of the swallow, and suspected functional pharyngeal swallow given clinical bedside indicators.  Pt is managing secretions. Oral motor ROM is WFL; Wears dentures. Intelligible speech. Voice is strong/audible. Pt can follow all commands for assessment.   PO trials 3 oz water challenge, solid foods/shortbread cookie.   ORAL PHASE: labial seal is intact, there is no labial loss of the oral bolus. Mastication is effective. Oral bolus formation and manipulation WFL. No significant oral residue after the swallow.   No pocketing.   PHARYNGEAL PHASE: no overt s/s aspiration, no change in vocal quality or respirations.   Will suggest pt maintain an oral diet...  ST to sign off, please make NPO if any changes in medical status or mentation that may impact safe chewing or swallowing, and consult ST for further testing.        Plan:  SLP Plan: No skilled SLP  No Skilled SLP: At baseline function, No acute SLP goals identified  Patient/Caregiver Agreeable: Yes  SLP - OK to Discharge: Yes    Subjective   Pt seen chairside, he finished breakfast, reports no difficulty/changes to chewing/swallowing. Pt took 4 meds whole without difficulty as well.  RN reports no issues. Pt's speech is intelligible, voice is audible, he easily engages in turn-taking conversation, on task, answers all questions, can verbalize events leading up to  hospitalization and tests he's had since he's been here. He is pleasant, cooperative.    Temp 97.7.  SpO2 95% on room air. WBC 6.5    Per H&P:  Alexander Rose is a 79-year-old male who came to the ED today after going to his primary care doctor appointment. Patient has a history of CAD with stent placement, obesity, venous stasis, and mild to moderate sensorineural hearing loss. Patient reports that he fell almost two weeks ago and saw his primary care doctor who wanted him to follow up with an MRI/have labs drawn and then come back for another appointment. Since the time from the first fall patient has been having episodes where he feels out of balance. Patient denies dizziness, headaches, changes in vision, or where he feels like the room is spinning. Yesterday patient fell again, he said he has been feeling like he is going to fall forward and then loses his balance. Denies any N/V/D with these episodes. Patient today went to his primary care appointment today and reported he was unable to get the scans because they aren't scheduled for another few weeks so his primary care doctor sent him to the ED due to the increased falling. Patient states since the first fall his right leg has been swelling but has improved     MR Brain wo IV contrast 4/25  IMPRESSION:  MRI Brain:  No evidence of acute infarct, intracranial mass effect or midline  shift.  Mild to moderate nonspecific white matter changes and parenchymal  volume loss.      MRA:  No evidence of major vessel cutoff or significant stenosis on MRA  head and neck.    General Visit Information:  Patient Class: Inpatient  [employer] Diet: regular texture, thin liquids  Current Diet : regular texture, thin liquids  Dysphagia Diagnosis: Within functional limits  Baseline Assessment:  Respiratory Status: Room air  Behavior/Cognition: Alert, Cooperative, Pleasant mood  Hearing: Within Functional Limits  Patient Positioning: Upright in Bed  Baseline Vocal Quality:  Normal  Volitional Cough: Strong  Volitional Swallow: Within Functional Limits  Pain:  Pain Assessment: 0-10  Pain Score: 0 - No pain   Oral/Motor Assessment:  Dentition:  (dentures)  Oral Motor: Within Functional Limits  Labial ROM: Within Functional Limits  Lingual ROM: Within Functional Limits  Lingual Symmetry:  (slight pull to left)  Intelligibility: Intelligible  Breath Support: Adequate for speech  Hearing: Within Functional Limits  Clinical Observations:  Patient Positioning: Upright in Bed  Management of Oral Secretions: Adequate  Suck Swallow Breathe Coordination: Functional  Was The 3 oz Swallow Protocol Completed: Yes    Inpatient:  Education Documentation  SLP has provided pt and caregiver/RN with the results and recommendations of this session.

## 2024-04-26 NOTE — PROGRESS NOTES
Physical Therapy    Physical Therapy    Physical Therapy Evaluation    Patient Name: Alexander Rose  MRN: [id number]  Today's Date: 4/26/2024   Time Calculation  Start Time: 1012  Stop Time: 1037  Time Calculation (min): 25 min    Assessment/Plan   PT Assessment  End of Session Patient Position: Up in chair, Alarm off, not on at start of session  IP OR SWING BED PT PLAN  Inpatient or Swing Bed: Inpatient  PT Plan  PT Plan: PT Eval only  PT Eval Only Reason: Only single session needed  PT Frequency: Other (Comment) (oppt)  PT Recommended Transfer Status:  (prn use of sc)  PT - OK to Discharge: Yes    Subjective     Current Problem:  Patient Active Problem List   Diagnosis    Falls, initial encounter       General Visit Information:  General  Reason for Referral: PT fell yesterday hitting back of head, dizziness, off balance x 2 weeks, rle swelling (-) dvt, ct c-sp and head (-), mri brain (-), imblance, suspect cva vs tia  Past Medical History Relevant to Rehab: Past med history of CAD with stent placement, obesity, venous stasis, and mild to moderate sensorineural hearing loss. Patient reports that he fell almost two weeks ago  Prior to Session Communication: Bedside nurse  Patient Position Received: Up in chair, Alarm on    Home Living:  Home Living  Type of Home:  (5 entry steps with rail into home, bed/bath 2nd level, no 1/2 bath 1st floor. Tub/shower combo 2nd floor without DME. [place] - no dme. Pt indep with driving/ adl/cooking PTA. No ADL equip. Pt owns a standard walker and straight cane but does not use.)  Lives With:  (w/ son who works)    Prior Level of Function:  Prior Function Per Pt/Caregiver Report  Level of [place]:  (indep w/o ad, does report falls outside and occas  reaches for furniture for support)  Homemaking Assistance:  (indep)    Precautions:       Vital Signs:     Objective     Pain:  Pain Assessment  Pain Score: 0 - No pain    Cognition:  Cognition  Overall Cognitive Status: Within  Functional Limits    General Assessments:                  Coordination  Movements are Fluid and Coordinated:  (b toe tap)             Functional Assessments:        Transfers  Transfer:  (indep)  Ambulation/Gait Training  Ambulation/Gait Training Performed:  (indep w/o ad 140ft except sba as  1x pt reached for wall for balance, up/down flight of steps w/ hr supervised, instructed pt in use of sc for outside use as past falls have occured outside home, ambs mod indep/s w/ sc 100ft no lob)          Extremity/Trunk Assessments:        RLE   RLE : Within Functional Limits  LLE   LLE : Within Functional Limits    Outcome Measures:  [facility] Basic Mobility  Turning from your back to your side while in a flat bed without using bedrails: None  Moving from lying on your back to sitting on the side of a flat bed without using bedrails: None  Moving to and from bed to chair (including a wheelchair): None  Standing up from a chair using your arms (e.g. wheelchair or bedside chair): None  To walk in hospital room: None  Climbing 3-5 steps with railing: None  Basic Mobility - Total Score: 24                                Education Documentation  No documentation found.  Education Comments  No comments found.

## 2024-04-26 NOTE — CONSULTS
Inpatient consult to Neurology  Consult performed by: Earnestine Hyde DO  Consult ordered by: CHRISTY Gurrola-CNP          History Of Present Illness  Alexander Rose is a 79 y.o. male presenting with falls. The patient reports that for a last few months he has felt unsteady when walking on uneven ground.  Over the last two weeks this unsteadiness has increased and he has had a few falls. He denies any dizziness. He denies any numbness. His legs do feel weak at times. He reports a sedentary lifestyle and spends most of his day seated. He used to exercise however has not been doing it recently. Does have some mild low back pain over the last week or so.  Saw his PCP who recommend admission to expedite work up. Denies any urinary incontinence.     Past Medical History  Past Medical History:   Diagnosis Date    Chronic rhinitis     Rhinitis    Counseling, unspecified 09/16/2017    Health counseling    Other specified postprocedural states     H/O cardiac catheterization    Personal history of other diseases of the circulatory system     History of coronary atherosclerosis    Personal history of other diseases of the circulatory system     History of rheumatic fever    Personal history of other infectious and parasitic diseases     History of measles    Personal history of other infectious and parasitic diseases     History of varicella    Personal history of other infectious and parasitic diseases     History of mumps    Presence of coronary angioplasty implant and graft 09/15/2017    History of heart artery stent    Tobacco abuse counseling 09/16/2017    Tobacco abuse counseling     Surgical History  Past Surgical History:   Procedure Laterality Date    EYE SURGERY  09/15/2017    Eye Surgery     Social History  Social History     Tobacco Use    Smoking status: Unknown     Allergies  Patient has no known allergies.  Medications Prior to Admission   Medication Sig Dispense Refill Last Dose    aspirin 81 mg EC  tablet Take 1 tablet (81 mg) by mouth once daily at bedtime.   4/24/2024 at pm    clopidogrel (Plavix) 75 mg tablet Take 1 tablet (75 mg) by mouth once daily.   4/25/2024 at am    metoprolol tartrate (Lopressor) 25 mg tablet Take 1 tablet (25 mg) by mouth 2 times a day.   4/25/2024 at am    multivitamin with minerals tablet Take 1 tablet by mouth once daily at bedtime.   4/24/2024 at pm    simvastatin (Zocor) 40 mg tablet Take 1 tablet (40 mg) by mouth once daily at bedtime.   4/24/2024 at pm       Review of Systems   Musculoskeletal:  Positive for gait problem.   All other systems reviewed and are negative.    Neurological Exam  Physical Exam    On general examination, the patient is well appearing and well groomed. Heart is regular, rate and rhythm. Lungs are clear to ausculation bilaterally. There is no peripheral edema. Normal pedal pulses bilaterally. No carotid bruits.   On neurologic examination, the mental status is unremarkable to informal testing. The history is related in quite good detail with no obvious deficit of attention, memory or language. Fund of knowledge is adequate. Orientation is intact to person, place and time. Spontaneous speech is fluent with no paraphasic or aphasic errors. On cranial nerve exam, the pupils are equal, round and reactive to light. Extraocular movements are full, without nystagmus. She reports no double vision on sustained upgaze or lateral gaze. Visual fields are full to confrontation. The fundi are benign with normal sharp disc margins. There is no bulbofacial weakness or ptosis. There is no ptosis with sustained upgaze. The tongue is midline with no wasting or fasciculations. The palate elevates symmetrically. Facial sensation is intact to light touch and pinprick bilaterally. Hearing is intact to finger rub bilaterally. Shoulder shrug is 5/5 bilaterally.  Neck flexion and extension have full strength. Motor examination reveals normal tone and bulk in the upper and lower  "extremities bilaterally. There are no fasciculations. There is full strength in the proximal and distal muscles of the upper and lower extremities bilaterally. Deep tendon reflexes are 2/4 and symmetric throughout the upper and lower extremities bilaterally, including the ankle jerks. Plantar responses are flexor bilaterally. Fine finger movements and rapid alternating movements are done well in both hands. There is no tremor. On coordination testing, finger-nose-finger testing are done well bilaterally. Sensory examination reveals normal light touch and vibratory sensation in the distal upper and lower extremities bilaterally. Gait show is fairly normal however he is unsteady with tandem gait. There is no Romberg sign. On functional testing, the patient can arise from a chair with no difficulty.     Last Recorded Vitals  Blood pressure 139/69, pulse 61, temperature 37 °C (98.6 °F), temperature source Temporal, resp. rate 18, height 1.727 m (5' 8\"), weight 89.8 kg (197 lb 15.6 oz), SpO2 97%.    Relevant Results  Scheduled medications  aspirin, 81 mg, oral, Nightly  clopidogrel, 75 mg, oral, Daily  enoxaparin, 40 mg, subcutaneous, q24h  metoprolol tartrate, 25 mg, oral, BID  multivitamin with minerals, 1 tablet, oral, Nightly  simvastatin, 40 mg, oral, Nightly      Continuous medications     PRN medications  PRN medications: acetaminophen, ondansetron    Results for orders placed or performed during the hospital encounter of 04/25/24 (from the past 24 hour(s))   CBC   Result Value Ref Range    WBC 6.5 4.4 - 11.3 x10*3/uL    nRBC 0.0 0.0 - 0.0 /100 WBCs    RBC 4.75 4.50 - 5.90 x10*6/uL    Hemoglobin 14.9 13.5 - 17.5 g/dL    Hematocrit 44.0 41.0 - 52.0 %    MCV 93 80 - 100 fL    MCH 31.4 26.0 - 34.0 pg    MCHC 33.9 32.0 - 36.0 g/dL    RDW 12.2 11.5 - 14.5 %    Platelets 150 150 - 450 x10*3/uL   Basic metabolic panel   Result Value Ref Range    Glucose 94 74 - 99 mg/dL    Sodium 140 136 - 145 mmol/L    Potassium 4.6 3.5 " - 5.3 mmol/L    Chloride 109 (H) 98 - 107 mmol/L    Bicarbonate 26 21 - 32 mmol/L    Anion Gap 10 10 - 20 mmol/L    Urea Nitrogen 16 6 - 23 mg/dL    Creatinine 1.14 0.50 - 1.30 mg/dL    eGFR 66 >60 mL/min/1.73m*2    Calcium 8.4 (L) 8.6 - 10.3 mg/dL   TSH   Result Value Ref Range    Thyroid Stimulating Hormone 2.96 0.44 - 3.98 mIU/L                Makeda Coma Scale  Best Eye Response: Spontaneous  Best Verbal Response: Oriented  Best Motor Response: Follows commands  M[place]a Coma Scale Score: 15                 I have personally reviewed the following imaging results CT angio chest for pulmonary embolism    Result Date: 4/25/2024  Interpreted By:  Sandy Thomas, STUDY: CT ANGIO CHEST FOR PULMONARY EMBOLISM;  4/25/2024 8:09 pm   INDICATION: Signs/Symptoms:elevated d-dimer.   COMPARISON: Noncontrast chest CT 10/20/2019   ACCESSION NUMBER(S): [id number]   ORDERING CLINICIAN: AARON LONG   TECHNIQUE: Axial CT images of the chest obtained  after intravenous administration of contrast. Maximum intensity projection images were created and reviewed.   FINDINGS: VESSELS: No aortic aneurysm. Calcified and noncalcified plaque throughout the descending thoracic aorta. No pulmonary embolism. HEART: Normal size. Moderate to severe coronary artery calcifications. No pericardial effusion. MEDIASTINUM AND ALAINA: No pathologically enlarged lymph nodes. LUNG, PLEURA, AND LARGE AIRWAYS: No pleural effusion or pneumothorax. No pulmonary consolidation or suspicious pulmonary nodule. Calcified granuloma in the right upper lobe. CHEST WALL AND LOWER NECK: Within normal limits.   UPPER ABDOMEN: No acute abnormality of the visualized abdomen. Stable hepatic cysts.   BONES: No acute osseous abnormality. Mild multilevel degenerative disc changes.       No pulmonary embolism or acute cardiopulmonary process.     MACRO: None   Signed by: Sandy Thomas 4/25/2024 9:44 PM Dictation workstation:   [id number]    MR brain wo IV contrast    Result  Date: 4/25/2024  Interpreted By:  Sneha Lubin, STUDY: MR BRAIN WO IV CONTRAST; MR ANGIO HEAD WO IV CONTRAST; MR ANGIO NECK WO IV CONTRAST;  4/25/2024 7:55 pm   INDICATION: Signs/Symptoms:ataxial falls; Signs/Symptoms:ataxia falls.  x   COMPARISON: CT head 04/25/2024   ACCESSION NUMBER(S): [id number]; [id number]; [id number]   ORDERING CLINICIAN: AARON LONG   TECHNIQUE: Axial T2, FLAIR, DWI, gradient echo T2 and  sagittal and coronal T1 weighted images of brain were acquired.   Time-of-flight MRA of the head  and neck was performed. The images were reviewed as source images and maximum intensity projections.   FINDINGS: Brain:   CSF Spaces: The ventricles, sulci and basal cisterns enlarged, concordant with parenchymal volume loss. Incidentally noted colpocephaly of the left lateral ventricle.   Parenchyma: There is no diffusion restriction abnormality to suggest acute infarct.  Scattered mild to moderate nonspecific T2/FLAIR hyperintense white matter changes present. Minimal T2/FLAIR hyperintense foci in the left basal ganglia. Prominent perivascular spaces within the basal ganglia. Moderate generalized parenchymal volume loss present. There is no mass effect or midline shift.   Paranasal Sinuses and Mastoids: Right greater than left mastoid air cell effusion. Mild mucosal thickening within the right sphenoid sinus. Otherwise visualized paranasal sinuses are unremarkable.   MRA of head:   Anterior circulation:    There is expected flow signal in bilateral intracranial internal carotid arteries, bilateral carotid terminals, bilateral proximal anterior and middle cerebral arteries.   Posterior circulation:    Bilateral intracranial vertebral arteries, vertebrobasilar junction, basilar artery and proximal posterior cerebral arteries demonstrate expected flow signal.   MRA of neck:   The source images are mildly degraded by artifact.   Right carotid vessels:  There is expected flow signal in the visualized  portion of the common carotid artery.  There is mild attenuation of flow signal at the carotid bifurcation which may be secondary to flow related artifact. The internal carotid artery in the neck demonstrates expected flow signal.   Left carotid vessels:   There is expected flow signal in the visualized portion of the common carotid artery.  There is mild attenuation of flow signal at the carotid bifurcation which may be secondary to flow related artifact. The internal carotid artery in the neck demonstrates expected flow signal.   Vertebral vessels:   The visualized segments of the cervical vertebral arteries demonstrate expected flow signal.       MRI Brain:   No evidence of acute infarct, intracranial mass effect or midline shift.   Mild to moderate nonspecific white matter changes and parenchymal volume loss.   MRA:   No evidence of major vessel cutoff or significant stenosis on MRA head and neck.   MACRO: None   Signed by: Sneha Lubin 4/25/2024 8:44 PM Dictation workstation:   [id number]    MR angio head wo IV contrast    Result Date: 4/25/2024  Interpreted By:  Sneha Lubin, STUDY: MR BRAIN WO IV CONTRAST; MR ANGIO HEAD WO IV CONTRAST; MR ANGIO NECK WO IV CONTRAST;  4/25/2024 7:55 pm   INDICATION: Signs/Symptoms:ataxial falls; Signs/Symptoms:ataxia falls.  x   COMPARISON: CT head 04/25/2024   ACCESSION NUMBER(S): [id number]; [id number]; [id number]   ORDERING CLINICIAN: AARON LONG   TECHNIQUE: Axial T2, FLAIR, DWI, gradient echo T2 and  sagittal and coronal T1 weighted images of brain were acquired.   Time-of-flight MRA of the head  and neck was performed. The images were reviewed as source images and maximum intensity projections.   FINDINGS: Brain:   CSF Spaces: The ventricles, sulci and basal cisterns enlarged, concordant with parenchymal volume loss. Incidentally noted colpocephaly of the left lateral ventricle.   Parenchyma: There is no diffusion restriction abnormality to suggest acute  infarct.  Scattered mild to moderate nonspecific T2/FLAIR hyperintense white matter changes present. Minimal T2/FLAIR hyperintense foci in the left basal ganglia. Prominent perivascular spaces within the basal ganglia. Moderate generalized parenchymal volume loss present. There is no mass effect or midline shift.   Paranasal Sinuses and Mastoids: Right greater than left mastoid air cell effusion. Mild mucosal thickening within the right sphenoid sinus. Otherwise visualized paranasal sinuses are unremarkable.   MRA of head:   Anterior circulation:    There is expected flow signal in bilateral intracranial internal carotid arteries, bilateral carotid terminals, bilateral proximal anterior and middle cerebral arteries.   Posterior circulation:    Bilateral intracranial vertebral arteries, vertebrobasilar junction, basilar artery and proximal posterior cerebral arteries demonstrate expected flow signal.   MRA of neck:   The source images are mildly degraded by artifact.   Right carotid vessels:  There is expected flow signal in the visualized portion of the common carotid artery.  There is mild attenuation of flow signal at the carotid bifurcation which may be secondary to flow related artifact. The internal carotid artery in the neck demonstrates expected flow signal.   Left carotid vessels:   There is expected flow signal in the visualized portion of the common carotid artery.  There is mild attenuation of flow signal at the carotid bifurcation which may be secondary to flow related artifact. The internal carotid artery in the neck demonstrates expected flow signal.   Vertebral vessels:   The visualized segments of the cervical vertebral arteries demonstrate expected flow signal.       MRI Brain:   No evidence of acute infarct, intracranial mass effect or midline shift.   Mild to moderate nonspecific white matter changes and parenchymal volume loss.   MRA:   No evidence of major vessel cutoff or significant stenosis  on MRA head and neck.   MACRO: None   Signed by: Sneha Lubin 4/25/2024 8:44 PM Dictation workstation:   [id number]    MR angio neck wo IV contrast    Result Date: 4/25/2024  Interpreted By:  Sneha Lubin, STUDY: MR BRAIN WO IV CONTRAST; MR ANGIO HEAD WO IV CONTRAST; MR ANGIO NECK WO IV CONTRAST;  4/25/2024 7:55 pm   INDICATION: Signs/Symptoms:ataxial falls; Signs/Symptoms:ataxia falls.  x   COMPARISON: CT head 04/25/2024   ACCESSION NUMBER(S): [id number]; [id number]; [id number]   ORDERING CLINICIAN: AARON LONG   TECHNIQUE: Axial T2, FLAIR, DWI, gradient echo T2 and  sagittal and coronal T1 weighted images of brain were acquired.   Time-of-flight MRA of the head  and neck was performed. The images were reviewed as source images and maximum intensity projections.   FINDINGS: Brain:   CSF Spaces: The ventricles, sulci and basal cisterns enlarged, concordant with parenchymal volume loss. Incidentally noted colpocephaly of the left lateral ventricle.   Parenchyma: There is no diffusion restriction abnormality to suggest acute infarct.  Scattered mild to moderate nonspecific T2/FLAIR hyperintense white matter changes present. Minimal T2/FLAIR hyperintense foci in the left basal ganglia. Prominent perivascular spaces within the basal ganglia. Moderate generalized parenchymal volume loss present. There is no mass effect or midline shift.   Paranasal Sinuses and Mastoids: Right greater than left mastoid air cell effusion. Mild mucosal thickening within the right sphenoid sinus. Otherwise visualized paranasal sinuses are unremarkable.   MRA of head:   Anterior circulation:    There is expected flow signal in bilateral intracranial internal carotid arteries, bilateral carotid terminals, bilateral proximal anterior and middle cerebral arteries.   Posterior circulation:    Bilateral intracranial vertebral arteries, vertebrobasilar junction, basilar artery and proximal posterior cerebral arteries demonstrate  expected flow signal.   MRA of neck:   The source images are mildly degraded by artifact.   Right carotid vessels:  There is expected flow signal in the visualized portion of the common carotid artery.  There is mild attenuation of flow signal at the carotid bifurcation which may be secondary to flow related artifact. The internal carotid artery in the neck demonstrates expected flow signal.   Left carotid vessels:   There is expected flow signal in the visualized portion of the common carotid artery.  There is mild attenuation of flow signal at the carotid bifurcation which may be secondary to flow related artifact. The internal carotid artery in the neck demonstrates expected flow signal.   Vertebral vessels:   The visualized segments of the cervical vertebral arteries demonstrate expected flow signal.       MRI Brain:   No evidence of acute infarct, intracranial mass effect or midline shift.   Mild to moderate nonspecific white matter changes and parenchymal volume loss.   MRA:   No evidence of major vessel cutoff or significant stenosis on MRA head and neck.   MACRO: None   Signed by: Sneha Lubin 4/25/2024 8:44 PM Dictation workstation:   [id number]      CT cervical spine wo IV contrast    Result Date: 4/25/2024  Interpreted By:  Lakshmi Higgins, STUDY: CT CERVICAL SPINE WO IV CONTRAST  4/25/2024 2:38 pm   INDICATION: Signs/Symptoms:Fall, head trauma   COMPARISON: None.   ACCESSION NUMBER(S): [id number]   ORDERING CLINICIAN: HONG HOYT   TECHNIQUE: Axial CT images of the cervical spine are obtained. Coronal and sagittal reconstructions are provided for review.   FINDINGS: Prevertebral/Paraspinal Soft Tissues/Lung Apices: No acute abnormalities.   Other: Partial opacification right mastoid air cells. Small right sphenoid mucocele.   CERVICAL SPINE: Hardware: None. Fracture: None. Vertebral Body Heights: Normal. Alignment: No traumatic listhesis. Spinal canal and neural foramina: Multilevel spondylosis,  most pronounced with severe neural foraminal narrowing at C3-C4 on the left, C4-C5 on the left, C5-C6 bilaterally. No high-grade canal stenosis.       No acute fracture or traumatic malalignment.   Signed by: Lakshmi Higgins 4/25/2024 2:57 PM Dictation workstation:   [id number]       Assessment/Plan   Principal Problem:    Falls, initial encounter      Mr. Rose is a 79 year old man whom presented with gait instability. Neurologic exam is overall normal other than difficulty with tandem gait.  Work up in the hospital has been negative for any cause.  Symptoms may be related to aging of vestibular system in conjunction with sensorineural hearing loss.  Recommend outpatient PT for gait and balance training.  Also discussed the importance of regular exercise and having more active lifestyle to avoid deconditioning.  If patient does not benefit from conservative management an EMG study could be considered for further evaluation.    Ok for discharge from neurologic standpoint. Will sign off.            Earnestine Hyde, DO

## 2024-04-26 NOTE — DISCHARGE SUMMARY
Discharge Diagnosis  TIA  Leg edema  falls    Issues Requiring Follow-Up  Follow-up with me and  neurology     Discharge Meds     Your medication list        CONTINUE taking these medications        Instructions Last Dose Given Next Dose Due   aspirin 81 mg EC tablet           clopidogrel 75 mg tablet  Commonly known as: Plavix           metoprolol tartrate 25 mg tablet  Commonly known as: Lopressor           multivitamin with minerals tablet           simvastatin 40 mg tablet  Commonly known as: Zocor                    Test Results Pending At Discharge  Pending Labs       No current pending labs.          patient today feels good MRI MRI unremarkable no DVT will discharge all questions answered patient also has been seen by neurology please see official note                    Objective   Last Recorded Vitals  /66 (BP Location: Right arm, Patient Position: Sitting)   Pulse 64   Temp 36.5 °C (97.7 °F) (Temporal)   Resp 18   Wt 89.8 kg (197 lb 15.6 oz)   SpO2 96%   Intake/Output last 3 Shifts:     Intake/Output Summary (Last 24 hours) at 4/26/2024 0858  Last data filed at 4/25/2024 2045      Gross per 24 hour   Intake 240 ml   Output --   Net 240 ml         Admission Weight  Weight: 89.8 kg (198 lb) (04/25/24 1329)     Daily Weight  04/25/24 : 89.8 kg (197 lb 15.6 oz)     Image Results  CT angio chest for pulmonary embolism  Narrative: Interpreted By:  Sandy Thomas,   STUDY:  CT ANGIO CHEST FOR PULMONARY EMBOLISM;  4/25/2024 8:09 pm      INDICATION:  Signs/Symptoms:elevated d-dimer.      COMPARISON:  Noncontrast chest CT 10/20/2019      ACCESSION NUMBER(S):  [id number]      ORDERING CLINICIAN:  AARON LONG      TECHNIQUE:  Axial CT images of the chest obtained  after intravenous  administration of contrast. Maximum intensity projection images were  created and reviewed.      FINDINGS:  VESSELS: No aortic aneurysm. Calcified and noncalcified plaque  throughout the descending thoracic aorta. No  pulmonary embolism.  HEART: Normal size. Moderate to severe coronary artery  calcifications. No pericardial effusion. MEDIASTINUM AND ALAINA: No  pathologically enlarged lymph nodes. LUNG, PLEURA, AND LARGE AIRWAYS:  No pleural effusion or pneumothorax. No pulmonary consolidation or  suspicious pulmonary nodule. Calcified granuloma in the right upper  lobe. CHEST WALL AND LOWER NECK: Within normal limits.      UPPER ABDOMEN: No acute abnormality of the visualized abdomen. Stable  hepatic cysts.      BONES: No acute osseous abnormality. Mild multilevel degenerative  disc changes.      Impression: No pulmonary embolism or acute cardiopulmonary process.          MACRO:  None      Signed by: Sandy Thomas 4/25/2024 9:44 PM  Dictation workstation:   [id number]  MR brain wo IV contrast, MR angio head wo IV contrast, MR angio neck wo IV contrast  Narrative: Interpreted By:  Sneha Lubin,   STUDY:  MR BRAIN WO IV CONTRAST; MR ANGIO HEAD WO IV CONTRAST; MR ANGIO NECK  WO IV CONTRAST;  4/25/2024 7:55 pm      INDICATION:  Signs/Symptoms:ataxial falls; Signs/Symptoms:ataxia falls.  x      COMPARISON:  CT head 04/25/2024      ACCESSION NUMBER(S):  [id number]; [id number]; [id number]      ORDERING CLINICIAN:  AARON LONG      TECHNIQUE:  Axial T2, FLAIR, DWI, gradient echo T2 and  sagittal and coronal T1  weighted images of brain were acquired.      Time-of-flight MRA of the head  and neck was performed. The images  were reviewed as source images and maximum intensity projections.      FINDINGS:  Brain:      CSF Spaces: The ventricles, sulci and basal cisterns enlarged,  concordant with parenchymal volume loss. Incidentally noted  colpocephaly of the left lateral ventricle.      Parenchyma: There is no diffusion restriction abnormality to suggest  acute infarct.  Scattered mild to moderate nonspecific T2/FLAIR  hyperintense white matter changes present. Minimal T2/FLAIR  hyperintense foci in the left basal ganglia.  Prominent perivascular  spaces within the basal ganglia. Moderate generalized parenchymal  volume loss present. There is no mass effect or midline shift.      Paranasal Sinuses and Mastoids: Right greater than left mastoid air  cell effusion. Mild mucosal thickening within the right sphenoid  sinus. Otherwise visualized paranasal sinuses are unremarkable.      MRA of head:      Anterior circulation:    There is expected flow signal in bilateral  intracranial internal carotid arteries, bilateral carotid terminals,  bilateral proximal anterior and middle cerebral arteries.      Posterior circulation:    Bilateral intracranial vertebral arteries,  vertebrobasilar junction, basilar artery and proximal posterior  cerebral arteries demonstrate expected flow signal.      MRA of neck:      The source images are mildly degraded by artifact.      Right carotid vessels:  There is expected flow signal in the  visualized portion of the common carotid artery.  There is mild  attenuation of flow signal at the carotid bifurcation which may be  secondary to flow related artifact. The internal carotid artery in  the neck demonstrates expected flow signal.      Left carotid vessels:   There is expected flow signal in the  visualized portion of the common carotid artery.  There is mild  attenuation of flow signal at the carotid bifurcation which may be  secondary to flow related artifact. The internal carotid artery in  the neck demonstrates expected flow signal.      Vertebral vessels:   The visualized segments of the cervical  vertebral arteries demonstrate expected flow signal.      Impression: MRI Brain:      No evidence of acute infarct, intracranial mass effect or midline  shift.      Mild to moderate nonspecific white matter changes and parenchymal  volume loss.      MRA:      No evidence of major vessel cutoff or significant stenosis on MRA  head and neck.      MACRO:  None      Signed by: Sneha Lubin 4/25/2024 8:44  PM  Dictation workstation:   [id number]  Vascular US Lower Extremity Venous Duplex Right             [facility]  [address] [place] [address]  Tel [phone] and Fax [phone]        Vascular Lab Report  VASC US LOWER EXTREMITY VENOUS DUPLEX RIGHT        Patient Name:      JUANA WRIGHT   Reading Physician:  [id number] Fabiano Zuniga MD, RPVI  Study Date:        4/25/2024            Ordering Physician: [id number] HONG HOYT  MRN/PID:           [id number]             Technologist:       Kelsea Dinh RVT  Accession#:        [id number]         Technologist 2:  Date of Birth/Age: 1945 / 79 years Encounter#:         [id number]  Gender:            M  Admission Status:  Emergency            Location Performed: [facility]        Diagnosis/ICD: Localized (leg) edema-R60.0  Indication:    Limb edema  CPT Codes:     53737 Peripheral venous duplex scan for DVT Limited        CONCLUSIONS:  Right Lower Venous: No evidence of acute deep vein thrombus visualized in the right lower extremity. Cannot rule out thrombus in non-visualized posterior tibial and Peroneal veins due to body habitus and edema.  Left Lower Venous: Left common femoral vein is negative for deep vein thrombus.     Imaging & Doppler Findings:     Right                 Compressible Thrombus        Flow  Distal External Iliac     Yes        None   Spontaneous/Phasic  CFV                       Yes        None   Spontaneous/Phasic  PFV                       Yes        None  FV Proximal               Yes        None   Spontaneous/Phasic  FV Mid                    Yes        None  FV Distal                 Yes        None  Popliteal                 Yes        None   Spontaneous/Phasic        Left Compress Thrombus        Flow  CFV    Yes      None   Spontaneous/Phasic        [id number] Fabiano Zuniga MD,  RPVI  Electronically signed by [id number] Fabiano Zuniga MD, RPVI on 4/25/2024 at 4:36:33 PM        ** Final **  CT cervical spine wo IV contrast  Narrative: Interpreted By:  Lakshmi Higgins,   STUDY:  CT CERVICAL SPINE WO IV CONTRAST  4/25/2024 2:38 pm      INDICATION:  Signs/Symptoms:Fall, head trauma      COMPARISON:  None.      ACCESSION NUMBER(S):  [id number]      ORDERING CLINICIAN:  HONG HOYT      TECHNIQUE:  Axial CT images of the cervical spine are obtained. Coronal and  sagittal reconstructions are provided for review.      FINDINGS:  Prevertebral/Paraspinal Soft Tissues/Lung Apices: No acute  abnormalities.      Other: Partial opacification right mastoid air cells. Small right  sphenoid mucocele.      CERVICAL SPINE:  Hardware: None.  Fracture: None.  Vertebral Body Heights: Normal.  Alignment: No traumatic listhesis.  Spinal canal and neural foramina: Multilevel spondylosis, most  pronounced with severe neural foraminal narrowing at C3-C4 on the  left, C4-C5 on the left, C5-C6 bilaterally. No high-grade canal  stenosis.      Impression: No acute fracture or traumatic malalignment.      Signed by: Lakshmi Higgins 4/25/2024 2:57 PM  Dictation workstation:   [id number]  CT head W O contrast trauma protocol  Narrative: Interpreted By:  Lakshmi Higgins,   STUDY:  CT HEAD W/O CONTRAST TRAUMA PROTOCOL;  4/25/2024 2:38 pm      INDICATION:  Signs/Symptoms:Fall, head trauma.      COMPARISON:  None.      ACCESSION NUMBER(S):  [id number]      ORDERING CLINICIAN:  HONG HOYT      TECHNIQUE:  Noncontrast axial CT scan of head was performed.      FINDINGS:  Parenchyma: There is no intracranial hemorrhage. The grey-white  differentiation is intact. There is no mass effect or midline shift.      CSF Spaces: The ventricles, sulci and basal cisterns are within  normal limits for age.      Extra-Axial Fluid: No extraaxial fluid collection.      Calvarium: No acute fracture.      Paranasal sinuses: Visualized  paranasal sinuses are clear.      Mastoids: Clear.      Orbits: Normal.      Soft tissues: Unremarkable.      Impression: No acute intracranial abnormality or calvarial fracture.      MACRO:  None      Signed by: Lakshmi Higgins 4/25/2024 2:49 PM  Dictation workstation:   [id number]      79-year-old male who came to the ED after seen in my office with ataxia tongue deviation and leg swelling unable to get outpatient workup in a timely fashion due to inspection of stroke versus DVT patient was sent to ER   t. Patient has a history of CAD with stent placement, obesity, venous stasis, and mild to moderate sensorineural hearing loss. Patient reports that he fell almost two weeks ago and saw his primary care doctor who wanted him to follow up with an MRI/have labs drawn and then come back for another appointment. Since the time from the first fall patient has been having episodes where he feels out of balance. Patient denies dizziness, headaches, changes in vision, or where he feels like the room is spinning. Yesterday patient fell again, he said he has been feeling like he is going to fall forward and then loses his balance. Denies any N/V/D with these episodes. P Patient states since the first fall his right leg has been swelling but has improved. Denies any visual cuts or change in strength. Patient also reports having urinary frequency and urgency throughout the day. Denies burning or pain while urinating. Patient denies any SOB, palpitations, chest pain, skin or sleep changes.   ED Course  Hemodynamically Stable.  Vitals: Temp.36.6 , HR 64 , Resp.16 , /69, Pulse ox 97 room air  Labs: Glucose 135, K+ 4.3,Na+ 139, Bun 14, Creat.  1.21, GFR 62, Ca 8.6, Alk Phos. 58, Albumin 3.9, ALT 15, AST 17, , Mg+ 1.95, Lact. 1.8, Hgb. 14.8, Hct. 43.8, Trop. 4, D-dimer 1,105  Medications: ASA, Plavix, Simvastatin  Imaging: CT cervical spine: No acute fracture or traumatic malalignment.  CT head: No acute intracranial  abnormality or calvarial fracture.  Vascular ultrasound lower extremities: No evidence of acute deep vein thrombosis visualized, exam limited due to habitus  EKG: Not available for my review     Past Medical History  Medical History           Past Medical History:   Diagnosis Date    Chronic rhinitis       Rhinitis    Counseling, unspecified 09/16/2017     Health counseling    Other specified postprocedural states       H/O cardiac catheterization    Personal history of other diseases of the circulatory system       History of coronary atherosclerosis    Personal history of other diseases of the circulatory system       History of rheumatic fever    Personal history of other infectious and parasitic diseases       History of measles    Personal history of other infectious and parasitic diseases       History of varicella    Personal history of other infectious and parasitic diseases       History of mumps    Presence of coronary angioplasty implant and graft 09/15/2017     History of heart artery stent    Tobacco abuse counseling 09/16/2017     Tobacco abuse counseling      Kidney stones     Surgical History  Surgical History             Past Surgical History:   Procedure Laterality Date    EYE SURGERY   09/15/2017     Eye Surgery      Cardiac cathetrization 2012 with stent placement     Social History  Smokes tobacco products for 55 years. Current everyday smoker.   Live at home with son.      Family History   Mom: colon cancer, breast cancer, Father: CVA, cardiac issues  Allergies  Patient has no known allergies.     Review of Systems     10 point ROS systems completed and is negative except for what is stated in HPI.     Physical Exam 4/26/2024  Constitutional:       General: He is awake. He is not in acute distress.     Appearance: Normal appearance. He is well-developed. He is not toxic-appearing.   HENT:      Head: Normocephalic and atraumatic.      Comments: Right eyelid droops. (Patient reports that is  normal had surgery when 7)     Nose: Nose normal. No rhinorrhea.      Mouth/Throat:      Mouth: Mucous membranes are moist.      Tongue: Tongue deviates from midline.      Comments: Difficulty moving tongue back to the right, deviates to the right  Eyes:      General:         Right eye: No discharge.         Left eye: No discharge.      Extraocular Movements: Extraocular movements intact.      Conjunctiva/sclera: Conjunctivae normal.      Pupils: Pupils are equal, round, and reactive to light.   Cardiovascular:      Rate and Rhythm: Normal rate and regular rhythm.      Pulses:           Radial pulses are 2+ on the right side and 2+ on the left side.        Dorsalis pedis pulses are 1+ on the right side and 2+ on the left side.      Heart sounds: Normal heart sounds.   Pulmonary:      Effort: Pulmonary effort is normal. No respiratory distress.      Breath sounds: Normal breath sounds. No wheezing.   Abdominal:      General: Bowel sounds are normal. There is no distension.      Palpations: Abdomen is soft.      Tenderness: There is no abdominal tenderness. There is no guarding.   Musculoskeletal:         General: Normal range of motion.      Cervical back: Normal range of motion and neck supple.      Right lower le+ Edema present.      Left lower leg: No edema.   Skin:     General: Skin is warm and dry.      Comments: Scab right anterior shin    Neurological:      General: No focal deficit present.      Mental Status: He is alert and oriented to person, place, and time. Mental status is at baseline.      GCS: GCS eye subscore is 4. GCS verbal subscore is 5. GCS motor subscore is 6.      Sensory: Sensation is intact.      Motor: Motor function is intact. No weakness.   Psychiatric:         Attention and Perception: Attention normal.         Mood and Affect: Mood normal.         Behavior: Behavior normal.            Last Recorded Vitals  Blood pressure 144/69, pulse 64, temperature 36.6 °C (97.9 °F), resp. rate 16,  "height 1.727 m (5' 8\"), weight 89.8 kg (197 lb 15.6 oz), SpO2 98%.     Relevant Results  Vascular US Lower Extremity Venous Duplex Right     Result Date: 4/25/2024           [place] [id number] Tel [phone] and Fax [phone]  Vascular Lab Report [facility] US LOWER EXTREMITY VENOUS DUPLEX RIGHT  Patient Name:      JUANA WRIGHT   Reading Physician:  [id number] Fabiano Zuniga MD, RPVI Study Date:        4/25/2024            Ordering Physician: [id number] HONG HOYT MRN/PID:           [id number]             Technologist:       Kelsea Dinh RVT Accession#:        [id number]         Technologist 2: Date of Birth/Age: 1945 / 79 years Encounter#:         [id number] Gender:            M Admission Status:  Emergency            Location Performed: [facility]  Diagnosis/ICD: Localized (leg) edema-R60.0 Indication:    Limb edema CPT Codes:     25662 Peripheral venous duplex scan for DVT Limited  CONCLUSIONS: Right Lower Venous: No evidence of acute deep vein thrombus visualized in the right lower extremity. Cannot rule out thrombus in non-visualized posterior tibial and Peroneal veins due to body habitus and edema. Left Lower Venous: Left common femoral vein is negative for deep vein thrombus.  Imaging & Doppler Findings:  Right                 Compressible Thrombus        Flow Distal External Iliac     Yes        None   Spontaneous/Phasic CFV                       Yes        None   Spontaneous/Phasic PFV                       Yes        None FV Proximal               Yes        None   Spontaneous/Phasic FV Mid                    Yes        None FV Distal                 Yes        None Popliteal                 Yes        None   Spontaneous/Phasic  Left Compress Thrombus        Flow CFV    Yes      None   Spontaneous/Phasic  [id number] Fabiano Zuniga MD, " RPVI Electronically signed by [id number] Fabiano Zuniga MD, RPVI on 4/25/2024 at 4:36:33 PM  ** Final **      CT cervical spine wo IV contrast     Result Date: 4/25/2024  Interpreted By:  Lakshmi Higgins, STUDY: CT CERVICAL SPINE WO IV CONTRAST  4/25/2024 2:38 pm   INDICATION: Signs/Symptoms:Fall, head trauma   COMPARISON: None.   ACCESSION NUMBER(S): [id number]   ORDERING CLINICIAN: HONG HOYT   TECHNIQUE: Axial CT images of the cervical spine are obtained. Coronal and sagittal reconstructions are provided for review.   FINDINGS: Prevertebral/Paraspinal Soft Tissues/Lung Apices: No acute abnormalities.   Other: Partial opacification right mastoid air cells. Small right sphenoid mucocele.   CERVICAL SPINE: Hardware: None. Fracture: None. Vertebral Body Heights: Normal. Alignment: No traumatic listhesis. Spinal canal and neural foramina: Multilevel spondylosis, most pronounced with severe neural foraminal narrowing at C3-C4 on the left, C4-C5 on the left, C5-C6 bilaterally. No high-grade canal stenosis.        No acute fracture or traumatic malalignment.   Signed by: Lakshmi Higgins 4/25/2024 2:57 PM Dictation workstation:   [id number]     CT head W O contrast trauma protocol     Result Date: 4/25/2024  Interpreted By:  Lakshmi Higgins, STUDY: CT HEAD W/O CONTRAST TRAUMA PROTOCOL;  4/25/2024 2:38 pm   INDICATION: Signs/Symptoms:Fall, head trauma.   COMPARISON: None.   ACCESSION NUMBER(S): [id number]   ORDERING CLINICIAN: HONG HOYT   TECHNIQUE: Noncontrast axial CT scan of head was performed.   FINDINGS: Parenchyma: There is no intracranial hemorrhage. The grey-white differentiation is intact. There is no mass effect or midline shift.   CSF Spaces: The ventricles, sulci and basal cisterns are within normal limits for age.   Extra-Axial Fluid: No extraaxial fluid collection.   Calvarium: No acute fracture.   Paranasal sinuses: Visualized paranasal sinuses are clear.   Mastoids: Clear.   Orbits: Normal.   Soft  tissues: Unremarkable.        No acute intracranial abnormality or calvarial fracture.   MACRO: None   Signed by: Lakshmi Higgins 4/25/2024 2:49 PM Dictation workstation:   [id number]             Results for orders placed or performed during the hospital encounter of 04/25/24 (from the past 24 hour(s))   CBC and Auto Differential   Result Value Ref Range     WBC 6.9 4.4 - 11.3 x10*3/uL     nRBC 0.0 0.0 - 0.0 /100 WBCs     RBC 4.69 4.50 - 5.90 x10*6/uL     Hemoglobin 14.8 13.5 - 17.5 g/dL     Hematocrit 43.8 41.0 - 52.0 %     MCV 93 80 - 100 fL     MCH 31.6 26.0 - 34.0 pg     MCHC 33.8 32.0 - 36.0 g/dL     RDW 12.4 11.5 - 14.5 %     Platelets 171 150 - 450 x10*3/uL     Neutrophils % 66.5 40.0 - 80.0 %     Immature Granulocytes %, Automated 0.3 0.0 - 0.9 %     Lymphocytes % 24.3 13.0 - 44.0 %     Monocytes % 5.8 2.0 - 10.0 %     Eosinophils % 2.2 0.0 - 6.0 %     Basophils % 0.9 0.0 - 2.0 %     Neutrophils Absolute 4.59 1.60 - 5.50 x10*3/uL     Immature Granulocytes Absolute, Automated 0.02 0.00 - 0.50 x10*3/uL     Lymphocytes Absolute 1.68 0.80 - 3.00 x10*3/uL     Monocytes Absolute 0.40 0.05 - 0.80 x10*3/uL     Eosinophils Absolute 0.15 0.00 - 0.40 x10*3/uL     Basophils Absolute 0.06 0.00 - 0.10 x10*3/uL   Comprehensive Metabolic Panel   Result Value Ref Range     Glucose 135 (H) 74 - 99 mg/dL     Sodium 139 136 - 145 mmol/L     Potassium 4.3 3.5 - 5.3 mmol/L     Chloride 106 98 - 107 mmol/L     Bicarbonate 29 21 - 32 mmol/L     Anion Gap 8 (L) 10 - 20 mmol/L     Urea Nitrogen 14 6 - 23 mg/dL     Creatinine 1.21 0.50 - 1.30 mg/dL     eGFR 62 >60 mL/min/1.73m*2     Calcium 8.6 8.6 - 10.3 mg/dL     Albumin 3.9 3.4 - 5.0 g/dL     Alkaline Phosphatase 58 33 - 136 U/L     Total Protein 6.4 6.4 - 8.2 g/dL     AST 17 9 - 39 U/L     Bilirubin, Total 0.5 0.0 - 1.2 mg/dL     ALT 15 10 - 52 U/L   Alcohol   Result Value Ref Range     Alcohol <10 <=10 mg/dL   Lactate   Result Value Ref Range     Lactate 1.8 0.4 - 2.0 mmol/L    Type And Screen   Result Value Ref Range     ABO TYPE B       Rh TYPE NEG       ANTIBODY SCREEN NEG     Troponin I, High Sensitivity   Result Value Ref Range     Troponin I, High Sensitivity 4 0 - 20 ng/L   B-Type Natriuretic Peptide   Result Value Ref Range      (H) 0 - 99 pg/mL   Magnesium   Result Value Ref Range     Magnesium 1.95 1.60 - 2.40 mg/dL   Protime-INR   Result Value Ref Range     Protime 11.8 9.8 - 12.8 seconds     INR 1.0 0.9 - 1.1   D-Dimer, VTE Exclusion   Result Value Ref Range     D-Dimer, Quantitative VTE Exclusion 1,105 (H) <=500 ng/mL FEU               Assessment/Plan   Alexander Rose is a 79-year-old male who came to the ED today after going to his primary care doctor appointment. Patient reports that he fell almost two weeks ago and saw his primary care doctor who wanted him to follow up with an MRI/have labs drawn. . Since the time from the first fall patient has been having episodes where he feels out of balance. Patient denies dizziness, headaches, changes in vision, or where he feels like the room is spinning. Yesterday patient fell again, he said he has been feeling like he is going to fall forward and then loses his balance. Neurology consult ordered for further evaluation. MRA of head and neck ordered.      Patient to follow with Dr. Rinku Almanza for further medical management.     #TIA  #Elevated D-Dimer duplex negative  #imbalance  #Falls  -  Chronic Conditions  #Venous stasis  #Obesity  #CAD  #Hearing loss     Patient has been seen by neurology stable for discharge MRI MRA unremarkable no acute stroke this could be probable TIA, all questions answered patient seen twice  Also seen by neurology for discharge today         Outpatient Follow-Up  Future Appointments   Date Time Provider Department Center   4/30/2024  3:30 PM [facility] MOBILE [facility]   4/30/2024  3:45 PM [facility] MOBILE [facility]   5/16/2024  1:00 PM [facility] ULTRASOUND  [facility]         Rinku Almanza MD

## 2024-04-26 NOTE — ED PROVIDER NOTES
HPI   Chief Complaint   Patient presents with    Fall     Pt arrives private auto from home. States fell yesterday hitting back of head and is taking plavix. States he has been off balance and falling for 2+ weeks as well. Went to dr kuo and was sent to ER for further testing. States cannot walk straight for weeks. MARQUISE called in triage @ 1328       Presents from PCPs office for difficulty ambulating for 2 weeks resulting in multiple falls.  Patient states that he feels off balance when he is attempting to ambulate.  He is not noticing that he is following any which way.  Denies headache, vision changes, dizziness, double vision, difficulty talking, difficulty swallowing, nausea, or focal numbness/weakness.  States that his most recent fall was yesterday, where he did strike the back of his head.  No reported loss of consciousness.  Also notes right leg swelling as well, which reportedly started after a prior fall.  No history of DVT or PE.      History provided by:  Patient   used: No                        Makeda Coma Scale Score: 15                     Patient History   Past Medical History:   Diagnosis Date    Chronic rhinitis     Rhinitis    Counseling, unspecified 09/16/2017    Health counseling    Other specified postprocedural states     H/O cardiac catheterization    Personal history of other diseases of the circulatory system     History of coronary atherosclerosis    Personal history of other diseases of the circulatory system     History of rheumatic fever    Personal history of other infectious and parasitic diseases     History of measles    Personal history of other infectious and parasitic diseases     History of varicella    Personal history of other infectious and parasitic diseases     History of mumps    Presence of coronary angioplasty implant and graft 09/15/2017    History of heart artery stent    Tobacco abuse counseling 09/16/2017    Tobacco abuse counseling     Past  Surgical History:   Procedure Laterality Date    EYE SURGERY  09/15/2017    Eye Surgery     No family history on file.  Social History     Tobacco Use    Smoking status: Unknown    Smokeless tobacco: Not on file   Substance Use Topics    Alcohol use: Not on file    Drug use: Not on file       Physical Exam   ED Triage Vitals   Temp Heart Rate Resp BP   04/25/24 1329 04/25/24 1329 04/25/24 1329 04/25/24 1329   36.9 °C (98.4 °F) 85 20 173/86      SpO2 Temp Source Heart Rate Source Patient Position   04/25/24 1329 04/25/24 1557 04/25/24 1557 04/25/24 1557   98 % Temporal Monitor Lying      BP Location FiO2 (%)     04/25/24 1557 --     Right arm        Physical Exam  Vitals and nursing note reviewed.   HENT:      Head: Atraumatic.      Comments: No Faulkner sign.  No raccoon eyes.     Mouth/Throat:      Mouth: Mucous membranes are moist.   Eyes:      Extraocular Movements: Extraocular movements intact.      Conjunctiva/sclera: Conjunctivae normal.      Pupils: Pupils are equal, round, and reactive to light.      Comments: No proptosis.  No pain with extraocular movements.   Cardiovascular:      Rate and Rhythm: Normal rate and regular rhythm.      Pulses: Normal pulses.      Comments: There is edema of the right lower extremity without associated erythema.  The edema is not pitting.  No pain with passive range of motion.  Neurovascularly intact.  Pulmonary:      Effort: Pulmonary effort is normal.   Abdominal:      Palpations: Abdomen is soft.      Tenderness: There is no abdominal tenderness.   Musculoskeletal:         General: No deformity.      Cervical back: Normal range of motion. No rigidity or tenderness.   Skin:     General: Skin is warm and dry.   Neurological:      Mental Status: He is alert.      Comments: Mentating appropriately.  Can identify the pen that I am holding as well as objects in the room.  No truncal ataxia.  No limb ataxia finger-nose-finger or heel-to-shin.  Does appear to have deviation of his  tongue to the right; additionally, he has baseline esotropia; cranial nerves II through XII otherwise grossly intact.  No drift with any of the extremities.         ED Course & MDM   ED Course as of 04/25/24 2155   Thu Apr 25, 2024   1424 D-Dimer, Quantitative VTE Exclusion(!): 1,105  Patient denies chest pain and shortness of breath.  Ultrasound pending. [AB]   1512 CT cervical spine wo IV contrast  Repeat exam after reviewing CT imaging.  Concern the upper extremity, equal  strength.  No decrease sensation of the hands.  Negative Daryl sign.  Concern the lower extremities, there is no hyperreflexia at the patellas bilaterally.  No clonus at the ankles.  Downgoing toes Babinski.  5/5 strength with ankle and great toe plantarflexion/dorsiflexion.  No upper motor neuron findings on exam.  Additionally, I had the patient walk at bedside.  He is able to ambulate without my assistance. [AB]      ED Course User Index  [AB] Gilmar Jama MD         Diagnoses as of 04/25/24 2155   Fall, initial encounter   Dizziness       Medical Decision Making  History of CAD status post stents presents with issues walking for the past several weeks resulting in multiple falls.    No traumatic findings on imaging from his falls.    Unclear etiology of patient's imbalance at present.  Does not have acute vestibular syndrome.  Neurologic exam is otherwise reassuring.  However, I do think that he would likely benefit from MRI, given his persistent difficulty ambulating resulting in falls.    Considered DVT of his right lower extremity.  Not seen on DVT ultrasound.  No chest pain or shortness of breath indicate CT PE at this time.    Will escalate care to hospitalization for further management.    Amount and/or Complexity of Data Reviewed  Labs: ordered.  Radiology: ordered.  Discussion of management or test interpretation with external provider(s): Admitting provider    Risk  Decision regarding  hospitalization.        Procedure  Procedures     Gilmar Jama MD  04/25/24 0067

## 2024-04-26 NOTE — PROGRESS NOTES
\Attending admit note/H&P patient has been seen in ER by ER physician nurse practitioner appreciate help   Alexander Rose is a 79 y.o. male on day 0 of admission presenting with Falls, initial encounter.      Subjective patient today feels good MRI MRI unremarkable no DVT will discharge all questions answered patient also has been seen by neurology please see official note           Objective     Last Recorded Vitals  /66 (BP Location: Right arm, Patient Position: Sitting)   Pulse 64   Temp 36.5 °C (97.7 °F) (Temporal)   Resp 18   Wt 89.8 kg (197 lb 15.6 oz)   SpO2 96%   Intake/Output last 3 Shifts:    Intake/Output Summary (Last 24 hours) at 4/26/2024 0858  Last data filed at 4/25/2024 2045  Gross per 24 hour   Intake 240 ml   Output --   Net 240 ml       Admission Weight  Weight: 89.8 kg (198 lb) (04/25/24 1329)    Daily Weight  04/25/24 : 89.8 kg (197 lb 15.6 oz)    Image Results  CT angio chest for pulmonary embolism  Narrative: Interpreted By:  Sandy Thomas,   STUDY:  CT ANGIO CHEST FOR PULMONARY EMBOLISM;  4/25/2024 8:09 pm      INDICATION:  Signs/Symptoms:elevated d-dimer.      COMPARISON:  Noncontrast chest CT 10/20/2019      ACCESSION NUMBER(S):  [id number]      ORDERING CLINICIAN:  AARON LONG      TECHNIQUE:  Axial CT images of the chest obtained  after intravenous  administration of contrast. Maximum intensity projection images were  created and reviewed.      FINDINGS:  VESSELS: No aortic aneurysm. Calcified and noncalcified plaque  throughout the descending thoracic aorta. No pulmonary embolism.  HEART: Normal size. Moderate to severe coronary artery  calcifications. No pericardial effusion. MEDIASTINUM AND ALAINA: No  pathologically enlarged lymph nodes. LUNG, PLEURA, AND LARGE AIRWAYS:  No pleural effusion or pneumothorax. No pulmonary consolidation or  suspicious pulmonary nodule. Calcified granuloma in the right upper  lobe. CHEST WALL AND LOWER NECK: Within normal limits.       UPPER ABDOMEN: No acute abnormality of the visualized abdomen. Stable  hepatic cysts.      BONES: No acute osseous abnormality. Mild multilevel degenerative  disc changes.      Impression: No pulmonary embolism or acute cardiopulmonary process.          MACRO:  None      Signed by: Sandy Thomas 4/25/2024 9:44 PM  Dictation workstation:   [id number]  MR brain wo IV contrast, MR angio head wo IV contrast, MR angio neck wo IV contrast  Narrative: Interpreted By:  Sneha Lubin,   STUDY:  MR BRAIN WO IV CONTRAST; MR ANGIO HEAD WO IV CONTRAST; MR ANGIO NECK  WO IV CONTRAST;  4/25/2024 7:55 pm      INDICATION:  Signs/Symptoms:ataxial falls; Signs/Symptoms:ataxia falls.  x      COMPARISON:  CT head 04/25/2024      ACCESSION NUMBER(S):  [id number]; [id number]; [id number]      ORDERING CLINICIAN:  AARON LONG      TECHNIQUE:  Axial T2, FLAIR, DWI, gradient echo T2 and  sagittal and coronal T1  weighted images of brain were acquired.      Time-of-flight MRA of the head  and neck was performed. The images  were reviewed as source images and maximum intensity projections.      FINDINGS:  Brain:      CSF Spaces: The ventricles, sulci and basal cisterns enlarged,  concordant with parenchymal volume loss. Incidentally noted  colpocephaly of the left lateral ventricle.      Parenchyma: There is no diffusion restriction abnormality to suggest  acute infarct.  Scattered mild to moderate nonspecific T2/FLAIR  hyperintense white matter changes present. Minimal T2/FLAIR  hyperintense foci in the left basal ganglia. Prominent perivascular  spaces within the basal ganglia. Moderate generalized parenchymal  volume loss present. There is no mass effect or midline shift.      Paranasal Sinuses and Mastoids: Right greater than left mastoid air  cell effusion. Mild mucosal thickening within the right sphenoid  sinus. Otherwise visualized paranasal sinuses are unremarkable.      MRA of head:      Anterior circulation:    There is  expected flow signal in bilateral  intracranial internal carotid arteries, bilateral carotid terminals,  bilateral proximal anterior and middle cerebral arteries.      Posterior circulation:    Bilateral intracranial vertebral arteries,  vertebrobasilar junction, basilar artery and proximal posterior  cerebral arteries demonstrate expected flow signal.      MRA of neck:      The source images are mildly degraded by artifact.      Right carotid vessels:  There is expected flow signal in the  visualized portion of the common carotid artery.  There is mild  attenuation of flow signal at the carotid bifurcation which may be  secondary to flow related artifact. The internal carotid artery in  the neck demonstrates expected flow signal.      Left carotid vessels:   There is expected flow signal in the  visualized portion of the common carotid artery.  There is mild  attenuation of flow signal at the carotid bifurcation which may be  secondary to flow related artifact. The internal carotid artery in  the neck demonstrates expected flow signal.      Vertebral vessels:   The visualized segments of the cervical  vertebral arteries demonstrate expected flow signal.      Impression: MRI Brain:      No evidence of acute infarct, intracranial mass effect or midline  shift.      Mild to moderate nonspecific white matter changes and parenchymal  volume loss.      MRA:      No evidence of major vessel cutoff or significant stenosis on MRA  head and neck.      MACRO:  None      Signed by: Sneha Lubin 4/25/2024 8:44 PM  Dictation workstation:   [id number]  Vascular US Lower Extremity Venous Duplex Right             [address] [address]  Tel [phone] and Fax [phone]       Vascular Lab Report  VASC US LOWER EXTREMITY VENOUS DUPLEX RIGHT       Patient Name:      JUANA LINDA WRIGHT   Reading Physician:  [id number] Fabiano Zuniga MD,  RPVI  Study Date:        4/25/2024            Ordering Physician: [id number] HONG HOYT  MRN/PID:           [id number]             Technologist:       Kelsea Dinh RVSARAH  Accession#:        [id number]         Technologist 2:  Date of Birth/Age: 1945 / 79 years Encounter#:         [id number]  Gender:            M  Admission Status:  Emergency            Location Performed: [facility]       Diagnosis/ICD: Localized (leg) edema-R60.0  Indication:    Limb edema  CPT Codes:     08044 Peripheral venous duplex scan for DVT Limited       CONCLUSIONS:  Right Lower Venous: No evidence of acute deep vein thrombus visualized in the right lower extremity. Cannot rule out thrombus in non-visualized posterior tibial and Peroneal veins due to body habitus and edema.  Left Lower Venous: Left common femoral vein is negative for deep vein thrombus.     Imaging & Doppler Findings:     Right                 Compressible Thrombus        Flow  Distal External Iliac     Yes        None   Spontaneous/Phasic  CFV                       Yes        None   Spontaneous/Phasic  PFV                       Yes        None  FV Proximal               Yes        None   Spontaneous/Phasic  FV Mid                    Yes        None  FV Distal                 Yes        None  Popliteal                 Yes        None   Spontaneous/Phasic       Left Compress Thrombus        Flow  CFV    Yes      None   Spontaneous/Phasic       [id number] Fabiano Zuniga MD, RPVI  Electronically signed by [id number] Fabiano Zuniga MD, RPVI on 4/25/2024 at 4:36:33 PM       ** Final **  CT cervical spine wo IV contrast  Narrative: Interpreted By:  Lakshmi Higgins,   STUDY:  CT CERVICAL SPINE WO IV CONTRAST  4/25/2024 2:38 pm      INDICATION:  Signs/Symptoms:Fall, head trauma      COMPARISON:  None.      ACCESSION NUMBER(S):  [id number]      ORDERING CLINICIAN:  HONG HOYT      TECHNIQUE:  Axial CT images  of the cervical spine are obtained. Coronal and  sagittal reconstructions are provided for review.      FINDINGS:  Prevertebral/Paraspinal Soft Tissues/Lung Apices: No acute  abnormalities.      Other: Partial opacification right mastoid air cells. Small right  sphenoid mucocele.      CERVICAL SPINE:  Hardware: None.  Fracture: None.  Vertebral Body Heights: Normal.  Alignment: No traumatic listhesis.  Spinal canal and neural foramina: Multilevel spondylosis, most  pronounced with severe neural foraminal narrowing at C3-C4 on the  left, C4-C5 on the left, C5-C6 bilaterally. No high-grade canal  stenosis.      Impression: No acute fracture or traumatic malalignment.      Signed by: Lakshmi Higgins 4/25/2024 2:57 PM  Dictation workstation:   [id number]  CT head W O contrast trauma protocol  Narrative: Interpreted By:  Lakshmi Higgins,   STUDY:  CT HEAD W/O CONTRAST TRAUMA PROTOCOL;  4/25/2024 2:38 pm      INDICATION:  Signs/Symptoms:Fall, head trauma.      COMPARISON:  None.      ACCESSION NUMBER(S):  [id number]      ORDERING CLINICIAN:  HONG HOYT      TECHNIQUE:  Noncontrast axial CT scan of head was performed.      FINDINGS:  Parenchyma: There is no intracranial hemorrhage. The grey-white  differentiation is intact. There is no mass effect or midline shift.      CSF Spaces: The ventricles, sulci and basal cisterns are within  normal limits for age.      Extra-Axial Fluid: No extraaxial fluid collection.      Calvarium: No acute fracture.      Paranasal sinuses: Visualized paranasal sinuses are clear.      Mastoids: Clear.      Orbits: Normal.      Soft tissues: Unremarkable.      Impression: No acute intracranial abnormality or calvarial fracture.      MACRO:  None      Signed by: Lakshmi Higgins 4/25/2024 2:49 PM  Dictation workstation:   [id number]     79-year-old male who came to the ED after seen in my office with ataxia tongue deviation and leg swelling unable to get outpatient workup in a timely  fashion due to inspection of stroke versus DVT patient was sent to ER   t. Patient has a history of CAD with stent placement, obesity, venous stasis, and mild to moderate sensorineural hearing loss. Patient reports that he fell almost two weeks ago and saw his primary care doctor who wanted him to follow up with an MRI/have labs drawn and then come back for another appointment. Since the time from the first fall patient has been having episodes where he feels out of balance. Patient denies dizziness, headaches, changes in vision, or where he feels like the room is spinning. Yesterday patient fell again, he said he has been feeling like he is going to fall forward and then loses his balance. Denies any N/V/D with these episodes. P Patient states since the first fall his right leg has been swelling but has improved. Denies any visual cuts or change in strength. Patient also reports having urinary frequency and urgency throughout the day. Denies burning or pain while urinating. Patient denies any SOB, palpitations, chest pain, skin or sleep changes.   ED Course  Hemodynamically Stable.  Vitals: Temp.36.6 , HR 64 , Resp.16 , /69, Pulse ox 97 room air  Labs: Glucose 135, K+ 4.3,Na+ 139, Bun 14, Creat.  1.21, GFR 62, Ca 8.6, Alk Phos. 58, Albumin 3.9, ALT 15, AST 17, , Mg+ 1.95, Lact. 1.8, Hgb. 14.8, Hct. 43.8, Trop. 4, D-dimer 1,105  Medications: ASA, Plavix, Simvastatin  Imaging: CT cervical spine: No acute fracture or traumatic malalignment.  CT head: No acute intracranial abnormality or calvarial fracture.  Vascular ultrasound lower extremities: No evidence of acute deep vein thrombosis visualized, exam limited due to habitus  EKG: Not available for my review     Past Medical History  Medical History        Past Medical History:   Diagnosis Date    Chronic rhinitis       Rhinitis    Counseling, unspecified 09/16/2017     Health counseling    Other specified postprocedural states       H/O cardiac  catheterization    Personal history of other diseases of the circulatory system       History of coronary atherosclerosis    Personal history of other diseases of the circulatory system       History of rheumatic fever    Personal history of other infectious and parasitic diseases       History of measles    Personal history of other infectious and parasitic diseases       History of varicella    Personal history of other infectious and parasitic diseases       History of mumps    Presence of coronary angioplasty implant and graft 09/15/2017     History of heart artery stent    Tobacco abuse counseling 09/16/2017     Tobacco abuse counseling      Kidney stones     Surgical History  Surgical History         Past Surgical History:   Procedure Laterality Date    EYE SURGERY   09/15/2017     Eye Surgery      Cardiac cathetrization 2012 with stent placement     Social History  Smokes tobacco products for 55 years. Current everyday smoker.   Live at home with son.      Family History   Mom: colon cancer, breast cancer, Father: CVA, cardiac issues  Allergies  Patient has no known allergies.     Review of Systems     10 point ROS systems completed and is negative except for what is stated in HPI.     Physical Exam 4/26/2024  Constitutional:       General: He is awake. He is not in acute distress.     Appearance: Normal appearance. He is well-developed. He is not toxic-appearing.   HENT:      Head: Normocephalic and atraumatic.      Comments: Right eyelid droops. (Patient reports that is normal had surgery when 7)     Nose: Nose normal. No rhinorrhea.      Mouth/Throat:      Mouth: Mucous membranes are moist.      Tongue: Tongue deviates from midline.      Comments: Difficulty moving tongue back to the right, deviates to the right  Eyes:      General:         Right eye: No discharge.         Left eye: No discharge.      Extraocular Movements: Extraocular movements intact.      Conjunctiva/sclera: Conjunctivae normal.       "Pupils: Pupils are equal, round, and reactive to light.   Cardiovascular:      Rate and Rhythm: Normal rate and regular rhythm.      Pulses:           Radial pulses are 2+ on the right side and 2+ on the left side.        Dorsalis pedis pulses are 1+ on the right side and 2+ on the left side.      Heart sounds: Normal heart sounds.   Pulmonary:      Effort: Pulmonary effort is normal. No respiratory distress.      Breath sounds: Normal breath sounds. No wheezing.   Abdominal:      General: Bowel sounds are normal. There is no distension.      Palpations: Abdomen is soft.      Tenderness: There is no abdominal tenderness. There is no guarding.   Musculoskeletal:         General: Normal range of motion.      Cervical back: Normal range of motion and neck supple.      Right lower le+ Edema present.      Left lower leg: No edema.   Skin:     General: Skin is warm and dry.      Comments: Scab right anterior shin    Neurological:      General: No focal deficit present.      Mental Status: He is alert and oriented to person, place, and time. Mental status is at baseline.      GCS: GCS eye subscore is 4. GCS verbal subscore is 5. GCS motor subscore is 6.      Sensory: Sensation is intact.      Motor: Motor function is intact. No weakness.   Psychiatric:         Attention and Perception: Attention normal.         Mood and Affect: Mood normal.         Behavior: Behavior normal.            Last Recorded Vitals  Blood pressure 144/69, pulse 64, temperature 36.6 °C (97.9 °F), resp. rate 16, height 1.727 m (5' 8\"), weight 89.8 kg (197 lb 15.6 oz), SpO2 98%.     Relevant Results  Vascular US Lower Extremity Venous Duplex Right     Result Date: 2024           [place] [id number] Tel [phone] and Fax [phone]  Vascular Lab Report VASC US LOWER EXTREMITY VENOUS DUPLEX RIGHT  Patient Name:      JUANA Auguste Physician:  [id number] Zuniga                           "                                   MD, RPVI Study Date:        4/25/2024            Ordering Physician: [id number] HONG HOYT MRN/PID:           [id number]             Technologist:       Kelsea Dinh RVSARAH Accession#:        [id number]         Technologist 2: Date of Birth/Age: 1945 / 79 years Encounter#:         [id number] Gender:            M Admission Status:  Emergency            Location Performed: [facility]  Diagnosis/ICD: Localized (leg) edema-R60.0 Indication:    Limb edema CPT Codes:     05352 Peripheral venous duplex scan for DVT Limited  CONCLUSIONS: Right Lower Venous: No evidence of acute deep vein thrombus visualized in the right lower extremity. Cannot rule out thrombus in non-visualized posterior tibial and Peroneal veins due to body habitus and edema. Left Lower Venous: Left common femoral vein is negative for deep vein thrombus.  Imaging & Doppler Findings:  Right                 Compressible Thrombus        Flow Distal External Iliac     Yes        None   Spontaneous/Phasic CFV                       Yes        None   Spontaneous/Phasic PFV                       Yes        None FV Proximal               Yes        None   Spontaneous/Phasic FV Mid                    Yes        None FV Distal                 Yes        None Popliteal                 Yes        None   Spontaneous/Phasic  Left Compress Thrombus        Flow CFV    Yes      None   Spontaneous/Phasic  [id number] Fabiano Zuniga MD, RPVI Electronically signed by [id number] Fabiano Zuniga MD, RPVI on 4/25/2024 at 4:36:33 PM  ** Final **      CT cervical spine wo IV contrast     Result Date: 4/25/2024  Interpreted By:  Lakshmi Higgins, STUDY: CT CERVICAL SPINE WO IV CONTRAST  4/25/2024 2:38 pm   INDICATION: Signs/Symptoms:Fall, head trauma   COMPARISON: None.   ACCESSION NUMBER(S): [id number]   ORDERING CLINICIAN: HONG HOYT   TECHNIQUE: Axial CT images of the cervical  spine are obtained. Coronal and sagittal reconstructions are provided for review.   FINDINGS: Prevertebral/Paraspinal Soft Tissues/Lung Apices: No acute abnormalities.   Other: Partial opacification right mastoid air cells. Small right sphenoid mucocele.   CERVICAL SPINE: Hardware: None. Fracture: None. Vertebral Body Heights: Normal. Alignment: No traumatic listhesis. Spinal canal and neural foramina: Multilevel spondylosis, most pronounced with severe neural foraminal narrowing at C3-C4 on the left, C4-C5 on the left, C5-C6 bilaterally. No high-grade canal stenosis.        No acute fracture or traumatic malalignment.   Signed by: Lakshmi Higgins 4/25/2024 2:57 PM Dictation workstation:   [id number]     CT head W O contrast trauma protocol     Result Date: 4/25/2024  Interpreted By:  Lakshmi Higgins, STUDY: CT HEAD W/O CONTRAST TRAUMA PROTOCOL;  4/25/2024 2:38 pm   INDICATION: Signs/Symptoms:Fall, head trauma.   COMPARISON: None.   ACCESSION NUMBER(S): [id number]   ORDERING CLINICIAN: HONG HOYT   TECHNIQUE: Noncontrast axial CT scan of head was performed.   FINDINGS: Parenchyma: There is no intracranial hemorrhage. The grey-white differentiation is intact. There is no mass effect or midline shift.   CSF Spaces: The ventricles, sulci and basal cisterns are within normal limits for age.   Extra-Axial Fluid: No extraaxial fluid collection.   Calvarium: No acute fracture.   Paranasal sinuses: Visualized paranasal sinuses are clear.   Mastoids: Clear.   Orbits: Normal.   Soft tissues: Unremarkable.        No acute intracranial abnormality or calvarial fracture.   MACRO: None   Signed by: Lakshmi Higgins 4/25/2024 2:49 PM Dictation workstation:   [id number]         Results for orders placed or performed during the hospital encounter of 04/25/24 (from the past 24 hour(s))   CBC and Auto Differential   Result Value Ref Range     WBC 6.9 4.4 - 11.3 x10*3/uL     nRBC 0.0 0.0 - 0.0 /100 WBCs     RBC 4.69 4.50 - 5.90  x10*6/uL     Hemoglobin 14.8 13.5 - 17.5 g/dL     Hematocrit 43.8 41.0 - 52.0 %     MCV 93 80 - 100 fL     MCH 31.6 26.0 - 34.0 pg     MCHC 33.8 32.0 - 36.0 g/dL     RDW 12.4 11.5 - 14.5 %     Platelets 171 150 - 450 x10*3/uL     Neutrophils % 66.5 40.0 - 80.0 %     Immature Granulocytes %, Automated 0.3 0.0 - 0.9 %     Lymphocytes % 24.3 13.0 - 44.0 %     Monocytes % 5.8 2.0 - 10.0 %     Eosinophils % 2.2 0.0 - 6.0 %     Basophils % 0.9 0.0 - 2.0 %     Neutrophils Absolute 4.59 1.60 - 5.50 x10*3/uL     Immature Granulocytes Absolute, Automated 0.02 0.00 - 0.50 x10*3/uL     Lymphocytes Absolute 1.68 0.80 - 3.00 x10*3/uL     Monocytes Absolute 0.40 0.05 - 0.80 x10*3/uL     Eosinophils Absolute 0.15 0.00 - 0.40 x10*3/uL     Basophils Absolute 0.06 0.00 - 0.10 x10*3/uL   Comprehensive Metabolic Panel   Result Value Ref Range     Glucose 135 (H) 74 - 99 mg/dL     Sodium 139 136 - 145 mmol/L     Potassium 4.3 3.5 - 5.3 mmol/L     Chloride 106 98 - 107 mmol/L     Bicarbonate 29 21 - 32 mmol/L     Anion Gap 8 (L) 10 - 20 mmol/L     Urea Nitrogen 14 6 - 23 mg/dL     Creatinine 1.21 0.50 - 1.30 mg/dL     eGFR 62 >60 mL/min/1.73m*2     Calcium 8.6 8.6 - 10.3 mg/dL     Albumin 3.9 3.4 - 5.0 g/dL     Alkaline Phosphatase 58 33 - 136 U/L     Total Protein 6.4 6.4 - 8.2 g/dL     AST 17 9 - 39 U/L     Bilirubin, Total 0.5 0.0 - 1.2 mg/dL     ALT 15 10 - 52 U/L   Alcohol   Result Value Ref Range     Alcohol <10 <=10 mg/dL   Lactate   Result Value Ref Range     Lactate 1.8 0.4 - 2.0 mmol/L   Type And Screen   Result Value Ref Range     ABO TYPE B       Rh TYPE NEG       ANTIBODY SCREEN NEG     Troponin I, High Sensitivity   Result Value Ref Range     Troponin I, High Sensitivity 4 0 - 20 ng/L   B-Type Natriuretic Peptide   Result Value Ref Range      (H) 0 - 99 pg/mL   Magnesium   Result Value Ref Range     Magnesium 1.95 1.60 - 2.40 mg/dL   Protime-INR   Result Value Ref Range     Protime 11.8 9.8 - 12.8 seconds     INR 1.0  0.9 - 1.1   D-Dimer, VTE Exclusion   Result Value Ref Range     D-Dimer, Quantitative VTE Exclusion 1,105 (H) <=500 ng/mL FEU               Assessment/Plan   Alexander Rose is a 79-year-old male who came to the ED today after going to his primary care doctor appointment. Patient reports that he fell almost two weeks ago and saw his primary care doctor who wanted him to follow up with an MRI/have labs drawn. . Since the time from the first fall patient has been having episodes where he feels out of balance. Patient denies dizziness, headaches, changes in vision, or where he feels like the room is spinning. Yesterday patient fell again, he said he has been feeling like he is going to fall forward and then loses his balance. Neurology consult ordered for further evaluation. MRA of head and neck ordered.      Patient to follow with Dr. Rinku Almanza for further medical management.     #TIA  #Elevated D-Dimer duplex negative  #imbalance  #Falls  -  Chronic Conditions  #Venous stasis  #Obesity  #CAD  #Hearing loss    Patient has been seen by neurology stable for discharge MRI MRA unremarkable no acute stroke this could be probable TIA, all questions answered patient seen twice  Also seen by neurology for discharge today              Rinku Almanza MD

## 2024-04-26 NOTE — NURSING NOTE
04/26/24 0810 Patient Navigator  I introduced myself to the patient and explained my role. He states he lives with his son and has his daughter near by who can assist him as needed. Pt states he smokes cigarettes, 1 pack per day, since he was young. I did  him on the benefits of quitting- he verbalized understanding. He states he doesn't do any exercise, that he frequently sits and watches TV. I informed the patient of the recommendations of 30 minutes 3 times a week in which he will start walking. Pt states he attempts to eat a healthy diet- we reviewed food options. I reviewed the handouts listed below, the Stroke Folder, and answered his questions. I reviewed the following lab values and what they indicate: cholesterol, HDL, LDL, triglycerides, and A1C. I gave the patient my business card & instructed him to call me as needed. He appreciated my visit along with the information I provided.    Handouts:   Stroke Folder  What can I eat? American Diabetes Association  Lifestyle changes to prevent a stroke- American Stroke Association  How do I follow a healthy diet pattern? American Heart Association  Understanding smoking and stroke-World Stroke Organization     Leonarda FERNANDEZ, RN  Patient Navigator  Stroke Educator  Diabetes Care &

## 2024-04-30 ENCOUNTER — APPOINTMENT (OUTPATIENT)
Dept: RADIOLOGY | Facility: CLINIC | Age: 79
End: 2024-04-30
Payer: MEDICARE

## 2024-05-07 ENCOUNTER — APPOINTMENT (OUTPATIENT)
Dept: RADIOLOGY | Facility: CLINIC | Age: 79
End: 2024-05-07
Payer: MEDICARE

## 2024-05-16 ENCOUNTER — APPOINTMENT (OUTPATIENT)
Dept: RADIOLOGY | Facility: CLINIC | Age: 79
End: 2024-05-16
Payer: MEDICARE